# Patient Record
Sex: MALE | Race: WHITE | NOT HISPANIC OR LATINO | Employment: FULL TIME | URBAN - METROPOLITAN AREA
[De-identification: names, ages, dates, MRNs, and addresses within clinical notes are randomized per-mention and may not be internally consistent; named-entity substitution may affect disease eponyms.]

---

## 2017-02-23 ENCOUNTER — LAB VISIT (OUTPATIENT)
Dept: LAB | Facility: HOSPITAL | Age: 27
End: 2017-02-23
Attending: FAMILY MEDICINE
Payer: COMMERCIAL

## 2017-02-23 ENCOUNTER — OFFICE VISIT (OUTPATIENT)
Dept: FAMILY MEDICINE | Facility: CLINIC | Age: 27
End: 2017-02-23
Payer: COMMERCIAL

## 2017-02-23 VITALS
HEART RATE: 91 BPM | WEIGHT: 225.31 LBS | HEIGHT: 72 IN | SYSTOLIC BLOOD PRESSURE: 120 MMHG | TEMPERATURE: 99 F | RESPIRATION RATE: 16 BRPM | BODY MASS INDEX: 30.52 KG/M2 | OXYGEN SATURATION: 99 % | DIASTOLIC BLOOD PRESSURE: 80 MMHG

## 2017-02-23 DIAGNOSIS — Z00.00 ENCOUNTER FOR WELLNESS EXAMINATION: ICD-10-CM

## 2017-02-23 DIAGNOSIS — Z00.00 ENCOUNTER FOR ANNUAL PHYSICAL EXAM: ICD-10-CM

## 2017-02-23 DIAGNOSIS — Z00.00 ENCOUNTER FOR ANNUAL PHYSICAL EXAM: Primary | ICD-10-CM

## 2017-02-23 LAB
ALBUMIN SERPL BCP-MCNC: 4.3 G/DL
ALP SERPL-CCNC: 67 U/L
ALT SERPL W/O P-5'-P-CCNC: 41 U/L
ANION GAP SERPL CALC-SCNC: 6 MMOL/L
AST SERPL-CCNC: 34 U/L
BILIRUB SERPL-MCNC: 2.2 MG/DL
BUN SERPL-MCNC: 13 MG/DL
CALCIUM SERPL-MCNC: 9.5 MG/DL
CHLORIDE SERPL-SCNC: 104 MMOL/L
CHOLEST/HDLC SERPL: 4.2 {RATIO}
CO2 SERPL-SCNC: 28 MMOL/L
CREAT SERPL-MCNC: 1 MG/DL
EST. GFR  (AFRICAN AMERICAN): >60 ML/MIN/1.73 M^2
EST. GFR  (NON AFRICAN AMERICAN): >60 ML/MIN/1.73 M^2
GLUCOSE SERPL-MCNC: 99 MG/DL
HAV IGM SERPL QL IA: NEGATIVE
HBV CORE IGM SERPL QL IA: NEGATIVE
HBV SURFACE AG SERPL QL IA: NEGATIVE
HCV AB SERPL QL IA: NEGATIVE
HDL/CHOLESTEROL RATIO: 24 %
HDLC SERPL-MCNC: 217 MG/DL
HDLC SERPL-MCNC: 52 MG/DL
HIV 1+2 AB+HIV1 P24 AG SERPL QL IA: NEGATIVE
LDLC SERPL CALC-MCNC: 147.2 MG/DL
NONHDLC SERPL-MCNC: 165 MG/DL
POTASSIUM SERPL-SCNC: 4 MMOL/L
PROT SERPL-MCNC: 7.7 G/DL
SODIUM SERPL-SCNC: 138 MMOL/L
TRIGL SERPL-MCNC: 89 MG/DL

## 2017-02-23 PROCEDURE — 99213 OFFICE O/P EST LOW 20 MIN: CPT | Mod: S$GLB,,, | Performed by: NURSE PRACTITIONER

## 2017-02-23 PROCEDURE — 80074 ACUTE HEPATITIS PANEL: CPT

## 2017-02-23 PROCEDURE — 99999 PR PBB SHADOW E&M-EST. PATIENT-LVL III: CPT | Mod: PBBFAC,,, | Performed by: NURSE PRACTITIONER

## 2017-02-23 PROCEDURE — 1160F RVW MEDS BY RX/DR IN RCRD: CPT | Mod: S$GLB,,, | Performed by: NURSE PRACTITIONER

## 2017-02-23 PROCEDURE — 80053 COMPREHEN METABOLIC PANEL: CPT

## 2017-02-23 PROCEDURE — 86703 HIV-1/HIV-2 1 RESULT ANTBDY: CPT

## 2017-02-23 PROCEDURE — 87591 N.GONORRHOEAE DNA AMP PROB: CPT

## 2017-02-23 PROCEDURE — 86592 SYPHILIS TEST NON-TREP QUAL: CPT

## 2017-02-23 PROCEDURE — 80061 LIPID PANEL: CPT

## 2017-02-23 PROCEDURE — 36415 COLL VENOUS BLD VENIPUNCTURE: CPT | Mod: PO

## 2017-02-23 RX ORDER — DEXTROAMPHETAMINE SACCHARATE, AMPHETAMINE ASPARTATE, DEXTROAMPHETAMINE SULFATE AND AMPHETAMINE SULFATE 2.5; 2.5; 2.5; 2.5 MG/1; MG/1; MG/1; MG/1
TABLET ORAL
COMMUNITY
Start: 2017-02-22 | End: 2017-05-24 | Stop reason: DRUGHIGH

## 2017-02-23 RX ORDER — KETOCONAZOLE 20 MG/G
CREAM TOPICAL
Refills: 3 | COMMUNITY
Start: 2017-01-21 | End: 2019-07-22

## 2017-02-23 NOTE — MR AVS SNAPSHOT
[facility] [place] [facility]  [address]  [place] [address]  Phone: [phone]  Fax: [phone]                  Francisco Maxwell   2017 9:00 AM   Office Visit    Description:  Male : 1990   Provider:  Nidia Belle NP   Department:  [facility] [place] [facility]           Reason for Visit     stomach virus           Diagnoses this Visit        Comments    Encounter for annual physical exam    -  Primary     Encounter for wellness examination                To Do List           Future Appointments        Provider Department Dept Phone    3/29/2017 8:00 AM JEOVANNY Coles MD [facility] [phone]      Goals (5 Years of Data)     None      [facility] On Call     [facility] On Call Nurse Care Line -  Assistance  Registered nurses in the [facility] On Call Center provide clinical advisement, health education, appointment booking, and other advisory services.  Call for this free service at [phone].             Medications           Message regarding Medications     Verify the changes and/or additions to your medication regime listed below are the same as discussed with your clinician today.  If any of these changes or additions are incorrect, please notify your healthcare provider.             Verify that the below list of medications is an accurate representation of the medications you are currently taking.  If none reported, the list may be blank. If incorrect, please contact your healthcare provider. Carry this list with you in case of emergency.           Current Medications     alprazolam (XANAX) 0.5 MG tablet TK 1 T PO TID PRF ANXIETY OR STRESS    dextroamphetamine-amphetamine 10 mg Tab     escitalopram oxalate (LEXAPRO) 10 MG tablet Take 1 tablet (10 mg total) by mouth once daily.    fish oil-omega-3 fatty acids 300-1,000 mg capsule Take 2 g by mouth once daily.    ketoconazole (NIZORAL) 2 % cream YOLIS AA BID           Clinical Reference Information            Your Vitals Were     BP Pulse Temp Resp Height Weight    120/80 91 98.6 °F (37 °C) (Oral) 16 6' (1.829 m) 102.2 kg (225 lb 5 oz)    SpO2 BMI             99% 30.56 kg/m2         Blood Pressure          Most Recent Value    BP  120/80      Allergies as of 2/23/2017     No Known Allergies      Immunizations Administered on Date of Encounter - 2/23/2017     None      Orders Placed During Today's Visit     Future Labs/Procedures Expected by Expires    C. trachomatis/N. gonorrhoeae by AMP DNA Urine  2/23/2017 4/24/2018    Comprehensive metabolic panel  2/23/2017 5/24/2017    Hepatitis panel, acute  2/23/2017 4/24/2018    HIV-1 and HIV-2 antibodies  2/23/2017 4/24/2018    Lipid panel  2/23/2017 4/24/2018    RPR  2/23/2017 4/24/2018      Smoking Cessation     If you would like to quit smoking:   You may be eligible for free services if you are a [place] resident and started smoking cigarettes before September 1, 1988.  Call the [employer]oking [employer]ess[employer] ([employer]) toll free at (665) 343-7590 or (285) 693-3572.   Call 1-463-QUIT-NOW if you do not meet the above criteria.            Language Assistance Services     ATTENTION: Language assistance services are available, free of charge. Please call [phone].      ATENCIÓN: Si habla español, tiene a savage disposición servicios gratuitos de asistencia lingüística. Llame al [phone].     CHÚ Ý: N?u b?n nói Ti?ng Vi?t, có các d?ch v? h? tr? ngôn ng? mi?n phí dành cho b?n. G?i s? [phone].         [facility] complies with applicable Federal civil rights laws and does not discriminate on the basis of race, color, national origin, age, disability, or sex.

## 2017-02-23 NOTE — PROGRESS NOTES
Subjective:       Patient ID: Francisco Maxwell is a 26 y.o. male.    Chief Complaint: stomach virus  She was last seen in primary care by Dr. Coles on 05/19/2016. This is his first time seeing me in the clinic.  HPI   He would like health screening for employment and other wellness screenings today.  Vitals:    02/23/17 0857   BP: 120/80   Pulse: 91   Resp: 16   Temp: 98.6 °F (37 °C)     Review of Systems    States would like STD screening with starting new relationship  He does take numerous herbs and naturopathic remedies   Objective:      Physical Exam   Constitutional: He is oriented to person, place, and time. Vital signs are normal. He appears well-developed and well-nourished.   HENT:   Head: Normocephalic and atraumatic.   Right Ear: Hearing, tympanic membrane, external ear and ear canal normal.   Left Ear: Hearing, tympanic membrane, external ear and ear canal normal.   Nose: Nose normal.   Mouth/Throat: Uvula is midline, oropharynx is clear and moist and mucous membranes are normal.   Eyes: Lids are normal.   Neck: Normal range of motion. Neck supple.   Cardiovascular: Normal rate, regular rhythm and normal heart sounds.    Pulmonary/Chest: Effort normal and breath sounds normal.   Abdominal: Soft. Bowel sounds are normal.   Lymphadenopathy:        Head (right side): No submental, no submandibular, no tonsillar, no preauricular, no posterior auricular and no occipital adenopathy present.        Head (left side): No submental, no submandibular, no tonsillar, no preauricular, no posterior auricular and no occipital adenopathy present.     He has no cervical adenopathy.   Neurological: He is alert and oriented to person, place, and time.   Skin: Skin is warm, dry and intact.   Psychiatric: He has a normal mood and affect. His speech is normal and behavior is normal. Judgment and thought content normal. Cognition and memory are normal.   Nursing note and vitals reviewed.      Assessment & Plan:        Encounter for annual physical exam  -     Lipid panel; Future; Expected date: 2/23/17  -     Comprehensive metabolic panel; Future; Expected date: 2/23/17  -     HIV-1 and HIV-2 antibodies; Future; Expected date: 2/23/17  -     RPR; Future; Expected date: 2/23/17  -     Hepatitis panel, acute; Future; Expected date: 2/23/17  -     C. trachomatis/N. gonorrhoeae by AMP DNA Urine; Future; Expected date: 2/23/17    Encounter for wellness examination  -     Lipid panel; Future; Expected date: 2/23/17  -     Comprehensive metabolic panel; Future; Expected date: 2/23/17  -     HIV-1 and HIV-2 antibodies; Future; Expected date: 2/23/17  -     RPR; Future; Expected date: 2/23/17  -     Hepatitis panel, acute; Future; Expected date: 2/23/17  -     C. trachomatis/N. gonorrhoeae by AMP DNA Urine; Future; Expected date: 2/23/17        Health screening from started and will complete when labs are available.  No Follow-up on file.

## 2017-02-24 LAB — RPR SER QL: NORMAL

## 2017-02-27 LAB
C TRACH DNA SPEC QL NAA+PROBE: NEGATIVE
N GONORRHOEA DNA SPEC QL NAA+PROBE: NEGATIVE

## 2017-03-29 ENCOUNTER — OFFICE VISIT (OUTPATIENT)
Dept: FAMILY MEDICINE | Facility: CLINIC | Age: 27
End: 2017-03-29
Payer: COMMERCIAL

## 2017-03-29 VITALS
HEART RATE: 82 BPM | RESPIRATION RATE: 16 BRPM | DIASTOLIC BLOOD PRESSURE: 80 MMHG | OXYGEN SATURATION: 97 % | HEIGHT: 72 IN | BODY MASS INDEX: 29.89 KG/M2 | WEIGHT: 220.69 LBS | TEMPERATURE: 99 F | SYSTOLIC BLOOD PRESSURE: 136 MMHG

## 2017-03-29 DIAGNOSIS — F41.9 ANXIETY: Primary | ICD-10-CM

## 2017-03-29 PROCEDURE — 99213 OFFICE O/P EST LOW 20 MIN: CPT | Mod: S$GLB,,, | Performed by: NURSE PRACTITIONER

## 2017-03-29 PROCEDURE — 1160F RVW MEDS BY RX/DR IN RCRD: CPT | Mod: S$GLB,,, | Performed by: NURSE PRACTITIONER

## 2017-03-29 PROCEDURE — 99999 PR PBB SHADOW E&M-EST. PATIENT-LVL III: CPT | Mod: PBBFAC,,, | Performed by: NURSE PRACTITIONER
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
## 2017-05-24 ENCOUNTER — OFFICE VISIT (OUTPATIENT)
Dept: FAMILY MEDICINE | Facility: CLINIC | Age: 27
End: 2017-05-24
Payer: COMMERCIAL

## 2017-05-24 VITALS
DIASTOLIC BLOOD PRESSURE: 70 MMHG | OXYGEN SATURATION: 98 % | WEIGHT: 213.88 LBS | HEART RATE: 70 BPM | HEIGHT: 72 IN | TEMPERATURE: 99 F | BODY MASS INDEX: 28.97 KG/M2 | SYSTOLIC BLOOD PRESSURE: 130 MMHG

## 2017-05-24 DIAGNOSIS — S99.921A FOOT INJURY, RIGHT, INITIAL ENCOUNTER: Primary | ICD-10-CM

## 2017-05-24 PROCEDURE — 1160F RVW MEDS BY RX/DR IN RCRD: CPT | Mod: S$GLB,,, | Performed by: NURSE PRACTITIONER

## 2017-05-24 PROCEDURE — 99213 OFFICE O/P EST LOW 20 MIN: CPT | Mod: S$GLB,,, | Performed by: NURSE PRACTITIONER

## 2017-05-24 RX ORDER — BENZONATATE 200 MG/1
1 CAPSULE ORAL
COMMUNITY
Start: 2017-03-18 | End: 2019-07-22

## 2017-05-24 RX ORDER — CALCIUM CARBONATE/VITAMIN D3 500-10/5ML
1 LIQUID (ML) ORAL DAILY
COMMUNITY

## 2017-05-24 RX ORDER — DEXTROAMPHETAMINE SACCHARATE, AMPHETAMINE ASPARTATE, DEXTROAMPHETAMINE SULFATE AND AMPHETAMINE SULFATE 3.75; 3.75; 3.75; 3.75 MG/1; MG/1; MG/1; MG/1
1 TABLET ORAL DAILY
Refills: 0 | COMMUNITY
Start: 2017-04-25 | End: 2019-04-18

## 2017-05-24 RX ORDER — MULTIVITAMIN
1 TABLET ORAL DAILY
COMMUNITY

## 2017-05-24 RX ORDER — MULTIVIT WITH MINERALS/HERBS
1 TABLET ORAL DAILY
COMMUNITY

## 2017-05-24 NOTE — PROGRESS NOTES
Subjective:       Patient ID: Francisco Maxwell is a 26 y.o. male.    Chief Complaint: Foot Problem (left)    HPI onset Sunday. Running on the beach and hyper  Extended his right foot. Having some discomfort to the top of his foot. States it is much improved from onset Sunday. He did not have any bruising. He has been elevating his foot and wearing good support shoes. No other concerns. See ROS    The following portion of the patients history was reviewed and updated as appropriate: allergies, current medications, past medical and surgical history. Past social history and problem list reviewed. Family PMH and Past social history reviewed. Tobacco, Illicit drug use reviewed.     Review of Systems  Constitutional: No fatigue or fever    Skin: no rashes or lesions  Respiratory:   No SOB, Wheezing, cough  Cardiovascular:   No CP, Palpitations  Musculoskeletal:   right  Foot pain, see HPI  No change in gait or coordination. .    Objective:     /70 (BP Location: Left arm, Patient Position: Sitting, BP Method: Manual)   Pulse 70   Temp 98.5 °F (36.9 °C) (Oral)   Ht 6' (1.829 m)   Wt 97 kg (213 lb 13.5 oz)   SpO2 98%   BMI 29.00 kg/m²      Physical Exam     Constitutional: oriented to person, place, and time. well-developed and well-nourished.   Cardiovascular: Normal rate, regular rhythm and normal heart sounds.    Pulmonary/Chest: Effort normal and breath sounds normal. No respiratory distress. No wheezes.   Musculoskeletal: Normal range of motion. some tenderness with flexion of the foot and extension. Pain to top of 2nd tendon.  Skin: Skin is warm and dry. No rashes or lesions. No bruising noted.     Assessment:       1. Foot injury, right, initial encounter        Plan:         Francisco was seen today for foot problem.    Diagnoses and all orders for this visit:    Foot injury, right, initial encounter: will get xray of the foot. No exercise until xray complete. If activity hurts, stop doing it. Wear  good support shoes.  -     X-Ray Foot Complete Left; Future      Healthy diet, exercise  Adequate rest  Adequate hydration  Avoid allergens  Avoid excessive caffeine

## 2018-08-10 DIAGNOSIS — B35.1 TOENAIL FUNGUS: ICD-10-CM

## 2018-08-10 DIAGNOSIS — F51.01 PRIMARY INSOMNIA: Primary | ICD-10-CM

## 2018-08-10 RX ORDER — TERBINAFINE HYDROCHLORIDE 250 MG/1
250 TABLET ORAL DAILY
Qty: 30 TABLET | Refills: 2 | Status: SHIPPED | OUTPATIENT
Start: 2018-08-10 | End: 2018-09-09

## 2019-02-26 DIAGNOSIS — L02.91 ABSCESS: Primary | ICD-10-CM

## 2019-02-26 RX ORDER — SULFAMETHOXAZOLE AND TRIMETHOPRIM 800; 160 MG/1; MG/1
1 TABLET ORAL 2 TIMES DAILY
Qty: 20 TABLET | Refills: 0 | Status: SHIPPED | OUTPATIENT
Start: 2019-02-26 | End: 2019-03-08

## 2019-04-18 ENCOUNTER — OFFICE VISIT (OUTPATIENT)
Dept: OPHTHALMOLOGY | Facility: CLINIC | Age: 29
End: 2019-04-18
Payer: COMMERCIAL

## 2019-04-18 DIAGNOSIS — S05.8X1A BLUNT TRAUMA OF RIGHT EYE, INITIAL ENCOUNTER: Primary | ICD-10-CM

## 2019-04-18 DIAGNOSIS — H52.4 MYOPIA OF BOTH EYES WITH ASTIGMATISM AND PRESBYOPIA: ICD-10-CM

## 2019-04-18 DIAGNOSIS — H52.13 MYOPIA OF BOTH EYES WITH ASTIGMATISM AND PRESBYOPIA: ICD-10-CM

## 2019-04-18 DIAGNOSIS — H52.203 MYOPIA OF BOTH EYES WITH ASTIGMATISM AND PRESBYOPIA: ICD-10-CM

## 2019-04-18 PROCEDURE — 92020 GONIOSCOPY: CPT | Mod: S$GLB,,, | Performed by: OPHTHALMOLOGY

## 2019-04-18 PROCEDURE — 92020 PR SPECIAL EYE EVAL,GONIOSCOPY: ICD-10-PCS | Mod: S$GLB,,, | Performed by: OPHTHALMOLOGY

## 2019-04-18 PROCEDURE — 92004 PR EYE EXAM, NEW PATIENT,COMPREHESV: ICD-10-PCS | Mod: S$GLB,,, | Performed by: OPHTHALMOLOGY

## 2019-04-18 PROCEDURE — 99999 PR PBB SHADOW E&M-EST. PATIENT-LVL III: ICD-10-PCS | Mod: PBBFAC,,, | Performed by: OPHTHALMOLOGY

## 2019-04-18 PROCEDURE — 99999 PR PBB SHADOW E&M-EST. PATIENT-LVL III: CPT | Mod: PBBFAC,,, | Performed by: OPHTHALMOLOGY

## 2019-04-18 PROCEDURE — 92004 COMPRE OPH EXAM NEW PT 1/>: CPT | Mod: S$GLB,,, | Performed by: OPHTHALMOLOGY

## 2019-04-18 RX ORDER — DEXTROAMPHETAMINE SACCHARATE, AMPHETAMINE ASPARTATE, DEXTROAMPHETAMINE SULFATE AND AMPHETAMINE SULFATE 7.5; 7.5; 7.5; 7.5 MG/1; MG/1; MG/1; MG/1
30 TABLET ORAL
COMMUNITY
Start: 2019-03-19 | End: 2022-06-13
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
## 2019-07-22 ENCOUNTER — OFFICE VISIT (OUTPATIENT)
Dept: FAMILY MEDICINE | Facility: CLINIC | Age: 29
End: 2019-07-22
Payer: COMMERCIAL

## 2019-07-22 VITALS
OXYGEN SATURATION: 99 % | SYSTOLIC BLOOD PRESSURE: 138 MMHG | HEIGHT: 76 IN | TEMPERATURE: 99 F | HEART RATE: 73 BPM | BODY MASS INDEX: 25.72 KG/M2 | DIASTOLIC BLOOD PRESSURE: 80 MMHG | WEIGHT: 211.19 LBS

## 2019-07-22 DIAGNOSIS — M79.10 MUSCLE PAIN: Primary | ICD-10-CM

## 2019-07-22 PROCEDURE — 99213 OFFICE O/P EST LOW 20 MIN: CPT | Mod: S$GLB,,, | Performed by: FAMILY MEDICINE

## 2019-07-22 PROCEDURE — 3008F PR BODY MASS INDEX (BMI) DOCUMENTED: ICD-10-PCS | Mod: CPTII,S$GLB,, | Performed by: FAMILY MEDICINE

## 2019-07-22 PROCEDURE — 99213 PR OFFICE/OUTPT VISIT, EST, LEVL III, 20-29 MIN: ICD-10-PCS | Mod: S$GLB,,, | Performed by: FAMILY MEDICINE

## 2019-07-22 PROCEDURE — 99999 PR PBB SHADOW E&M-EST. PATIENT-LVL III: ICD-10-PCS | Mod: PBBFAC,,, | Performed by: FAMILY MEDICINE

## 2019-07-22 PROCEDURE — 3008F BODY MASS INDEX DOCD: CPT | Mod: CPTII,S$GLB,, | Performed by: FAMILY MEDICINE

## 2019-07-22 PROCEDURE — 99999 PR PBB SHADOW E&M-EST. PATIENT-LVL III: CPT | Mod: PBBFAC,,, | Performed by: FAMILY MEDICINE

## 2019-07-22 RX ORDER — FINASTERIDE 1 MG/1
1 TABLET, FILM COATED ORAL DAILY
Refills: 3 | COMMUNITY
Start: 2019-05-12 | End: 2022-06-13

## 2019-07-22 NOTE — PROGRESS NOTES
"Subjective:       Patient ID: Francisco Maxwell is a 28 y.o. male.    Chief Complaint: Cough (coughing producing sputum, wheezing, and occasional heartburn)    Pt is known to me.    Review of Systems   Constitutional: Negative for activity change, appetite change, fatigue and unexpected weight change.   Eyes: Negative for visual disturbance.   Respiratory: Positive for cough and wheezing (with forceful exhalation). Negative for chest tightness and shortness of breath.    Cardiovascular: Negative for chest pain, palpitations and leg swelling.   Gastrointestinal: Negative for abdominal pain, constipation, diarrhea, nausea and vomiting.        Heartburn--started OTC Prilosec yesterday   Endocrine: Negative for cold intolerance, heat intolerance and polyuria.   Genitourinary: Negative for decreased urine volume and dysuria.   Musculoskeletal: Positive for myalgias (right neck in to chest). Negative for arthralgias and back pain.   Skin: Negative for rash.   Neurological: Negative for numbness and headaches.       Objective:       Vitals:    07/22/19 1612 07/22/19 1627   BP: 138/80    BP Location: Left arm    Patient Position: Sitting    Pulse: 73    Temp: 98.5 °F (36.9 °C)    TempSrc: Oral    SpO2: 99%    Weight: 95.8 kg (211 lb 3.2 oz)    Height: 6' (1.829 m) 6' 4" (1.93 m)     Physical Exam   Constitutional: He is oriented to person, place, and time. He appears well-developed and well-nourished.   HENT:   Head: Normocephalic.   Eyes: Pupils are equal, round, and reactive to light. Conjunctivae and EOM are normal.   Neck: Normal range of motion. Neck supple. No thyromegaly present.   Cardiovascular: Normal rate, regular rhythm and normal heart sounds.   Pulmonary/Chest: Effort normal and breath sounds normal.   Abdominal: Soft. Bowel sounds are normal. There is no tenderness.   Musculoskeletal: Normal range of motion. He exhibits tenderness (right SCM and right rhomboid). He exhibits no deformity. "   Lymphadenopathy:     He has no cervical adenopathy.   Neurological: He is alert and oriented to person, place, and time. He displays normal reflexes. No cranial nerve deficit. He exhibits normal muscle tone. Coordination normal.   Skin: Skin is warm and dry.   Psychiatric: He has a normal mood and affect. His behavior is normal.       Assessment:       1. Muscle pain        Plan:       Francisco was seen today for cough.    Diagnoses and all orders for this visit:    Muscle pain      During this visit, I reviewed the pt's history, medications, allergies, and problem list.    Stretches demonstrated

## 2019-07-23 ENCOUNTER — HOSPITAL ENCOUNTER (OUTPATIENT)
Dept: RADIOLOGY | Facility: HOSPITAL | Age: 29
Discharge: HOME | End: 2019-07-23
Attending: PHYSICIAN ASSISTANT
Payer: COMMERCIAL

## 2019-07-23 ENCOUNTER — OFFICE VISIT (OUTPATIENT)
Dept: FAMILY MEDICINE | Facility: CLINIC | Age: 29
End: 2019-07-23
Payer: COMMERCIAL

## 2019-07-23 VITALS
BODY MASS INDEX: 25.08 KG/M2 | WEIGHT: 205.94 LBS | HEART RATE: 75 BPM | DIASTOLIC BLOOD PRESSURE: 80 MMHG | TEMPERATURE: 98 F | SYSTOLIC BLOOD PRESSURE: 120 MMHG | HEIGHT: 76 IN | OXYGEN SATURATION: 98 %

## 2019-07-23 DIAGNOSIS — S90.851A FOREIGN BODY IN RIGHT FOOT, INITIAL ENCOUNTER: ICD-10-CM

## 2019-07-23 DIAGNOSIS — S91.332A PUNCTURE WOUND OF LEFT FOOT, INITIAL ENCOUNTER: Primary | ICD-10-CM

## 2019-07-23 PROCEDURE — 90471 TDAP VACCINE GREATER THAN OR EQUAL TO 7YO IM: ICD-10-PCS | Mod: S$GLB,,, | Performed by: PHYSICIAN ASSISTANT

## 2019-07-23 PROCEDURE — 73630 X-RAY EXAM OF FOOT: CPT | Mod: 26,RT,, | Performed by: RADIOLOGY

## 2019-07-23 PROCEDURE — 73630 X-RAY EXAM OF FOOT: CPT | Mod: TC,FY,PO,RT

## 2019-07-23 PROCEDURE — 3008F PR BODY MASS INDEX (BMI) DOCUMENTED: ICD-10-PCS | Mod: CPTII,S$GLB,, | Performed by: PHYSICIAN ASSISTANT

## 2019-07-23 PROCEDURE — 90715 TDAP VACCINE GREATER THAN OR EQUAL TO 7YO IM: ICD-10-PCS | Mod: S$GLB,,, | Performed by: PHYSICIAN ASSISTANT

## 2019-07-23 PROCEDURE — 99999 PR PBB SHADOW E&M-EST. PATIENT-LVL III: ICD-10-PCS | Mod: PBBFAC,,, | Performed by: PHYSICIAN ASSISTANT

## 2019-07-23 PROCEDURE — 99214 OFFICE O/P EST MOD 30 MIN: CPT | Mod: 25,S$GLB,, | Performed by: PHYSICIAN ASSISTANT

## 2019-07-23 PROCEDURE — 90471 IMMUNIZATION ADMIN: CPT | Mod: S$GLB,,, | Performed by: PHYSICIAN ASSISTANT

## 2019-07-23 PROCEDURE — 90715 TDAP VACCINE 7 YRS/> IM: CPT | Mod: S$GLB,,, | Performed by: PHYSICIAN ASSISTANT

## 2019-07-23 PROCEDURE — 99214 PR OFFICE/OUTPT VISIT, EST, LEVL IV, 30-39 MIN: ICD-10-PCS | Mod: 25,S$GLB,, | Performed by: PHYSICIAN ASSISTANT

## 2019-07-23 PROCEDURE — 3008F BODY MASS INDEX DOCD: CPT | Mod: CPTII,S$GLB,, | Performed by: PHYSICIAN ASSISTANT

## 2019-07-23 PROCEDURE — 99999 PR PBB SHADOW E&M-EST. PATIENT-LVL III: CPT | Mod: PBBFAC,,, | Performed by: PHYSICIAN ASSISTANT

## 2019-07-23 PROCEDURE — 73630 XR FOOT COMPLETE 3 VIEW RIGHT: ICD-10-PCS | Mod: 26,RT,, | Performed by: RADIOLOGY

## 2019-07-23 RX ORDER — SULFAMETHOXAZOLE AND TRIMETHOPRIM 800; 160 MG/1; MG/1
1 TABLET ORAL 2 TIMES DAILY
Qty: 14 TABLET | Refills: 0 | Status: SHIPPED | OUTPATIENT
Start: 2019-07-23 | End: 2019-07-23

## 2019-07-23 RX ORDER — SULFAMETHOXAZOLE AND TRIMETHOPRIM 800; 160 MG/1; MG/1
1 TABLET ORAL 2 TIMES DAILY
Qty: 14 TABLET | Refills: 0 | Status: SHIPPED | OUTPATIENT
Start: 2019-07-23 | End: 2019-07-30

## 2019-07-23 NOTE — PROGRESS NOTES
"actrimSubjective:      Patient ID: Francisco Maxwell is a 28 y.o. male.    Chief Complaint: Foot Injury (patient stepped on a toothpick lastnight and says the other half of the toothpick is still in his foot)    Patient is new to me, here today for foreign body to foot  Patient states he stepped on a toothpick and feels the other half of the toothpick is still in his foot   Patient has tried warm compresses and elevation    Foreign Body   The incident occurred 12 to 24 hours ago. Suspected object: a toothpick. Intake: foot. The incident was reported. Pertinent negatives include no abdominal pain, congestion, cough, fever, sore throat, vomiting or wheezing.     Review of Systems   Constitutional: Negative for chills, diaphoresis and fever.   HENT: Negative for congestion, rhinorrhea and sore throat.    Respiratory: Negative for cough, shortness of breath and wheezing.    Gastrointestinal: Negative for abdominal pain, constipation, diarrhea, nausea and vomiting.   Skin: Positive for wound (R foot). Negative for rash.   Neurological: Negative for dizziness, light-headedness and headaches.       Objective:   /80 (BP Location: Left arm, Patient Position: Sitting)   Pulse 75   Temp 98.4 °F (36.9 °C) (Oral)   Ht 6' 4" (1.93 m)   Wt 93.4 kg (205 lb 14.6 oz)   SpO2 98%   BMI 25.06 kg/m²   Physical Exam   Constitutional: He appears well-developed and well-nourished. He does not appear ill. No distress.   HENT:   Head: Normocephalic and atraumatic.   Cardiovascular: Normal rate, regular rhythm and normal heart sounds.   No murmur heard.  Pulmonary/Chest: Effort normal and breath sounds normal. No respiratory distress. He has no decreased breath sounds.   Musculoskeletal:        Right foot: There is tenderness. There is normal range of motion, no swelling, normal capillary refill and no deformity.        Feet:    Skin: Skin is warm, dry and intact. No rash noted.   Psychiatric: He has a normal mood and " affect. His speech is normal and behavior is normal. Thought content normal.     Assessment:      1. Puncture wound of left foot, initial encounter    2. Foreign body in right foot, initial encounter       Plan:   Puncture wound of left foot, initial encounter  -     (In Office Administered) Tdap Vaccine    Foreign body in right foot, initial encounter  -     X-Ray Foot Complete Right; Future; Expected date: 07/23/2019    Other orders  -     sulfamethoxazole-trimethoprim 800-160mg (BACTRIM DS) 800-160 mg Tab; Take 1 tablet by mouth 2 (two) times daily. for 7 days  Dispense: 14 tablet; Refill: 0    Discussed worsening signs/symptoms and when to return to clinic or go to ED.   Patient expresses understanding and agrees with treatment plan.     The pt was seen by Dr. Gruber on 7/25/2019--needs surgery under MAC to remove FB in foot.  Based upon my exam of the pt on 7/22/2019, the pt is cleared for surgery.//KIM Myers MD

## 2019-07-25 ENCOUNTER — HOSPITAL ENCOUNTER (OUTPATIENT)
Dept: RADIOLOGY | Facility: HOSPITAL | Age: 29
Discharge: HOME | End: 2019-07-25
Attending: PODIATRIST
Payer: COMMERCIAL

## 2019-07-25 ENCOUNTER — OFFICE VISIT (OUTPATIENT)
Dept: PODIATRY | Facility: CLINIC | Age: 29
End: 2019-07-25
Payer: COMMERCIAL

## 2019-07-25 VITALS
SYSTOLIC BLOOD PRESSURE: 122 MMHG | BODY MASS INDEX: 25.08 KG/M2 | HEIGHT: 76 IN | HEART RATE: 82 BPM | DIASTOLIC BLOOD PRESSURE: 75 MMHG | WEIGHT: 205.94 LBS

## 2019-07-25 DIAGNOSIS — L03.115 CELLULITIS OF FOOT, RIGHT: ICD-10-CM

## 2019-07-25 DIAGNOSIS — M79.671 PAIN IN RIGHT FOOT: ICD-10-CM

## 2019-07-25 DIAGNOSIS — L08.9 FOREIGN BODY IN FOOT, RIGHT, INFECTED, INITIAL ENCOUNTER: ICD-10-CM

## 2019-07-25 DIAGNOSIS — L08.9 FOREIGN BODY IN FOOT, RIGHT, INFECTED, INITIAL ENCOUNTER: Primary | ICD-10-CM

## 2019-07-25 DIAGNOSIS — S90.851A FOREIGN BODY IN FOOT, RIGHT, INFECTED, INITIAL ENCOUNTER: ICD-10-CM

## 2019-07-25 DIAGNOSIS — S90.851A FOREIGN BODY IN FOOT, RIGHT, INFECTED, INITIAL ENCOUNTER: Primary | ICD-10-CM

## 2019-07-25 PROCEDURE — 76882 US LMTD JT/FCL EVL NVASC XTR: CPT | Mod: TC,PO,RT

## 2019-07-25 PROCEDURE — 3008F PR BODY MASS INDEX (BMI) DOCUMENTED: ICD-10-PCS | Mod: CPTII,S$GLB,, | Performed by: PODIATRIST

## 2019-07-25 PROCEDURE — 99999 PR PBB SHADOW E&M-EST. PATIENT-LVL IV: CPT | Mod: PBBFAC,,, | Performed by: PODIATRIST

## 2019-07-25 PROCEDURE — 99204 PR OFFICE/OUTPT VISIT, NEW, LEVL IV, 45-59 MIN: ICD-10-PCS | Mod: 57,S$GLB,, | Performed by: PODIATRIST

## 2019-07-25 PROCEDURE — 76882 US EXTREMITY NON VASCULAR LIMITED RIGHT: ICD-10-PCS | Mod: 26,RT,, | Performed by: RADIOLOGY

## 2019-07-25 PROCEDURE — 99999 PR PBB SHADOW E&M-EST. PATIENT-LVL IV: ICD-10-PCS | Mod: PBBFAC,,, | Performed by: PODIATRIST

## 2019-07-25 PROCEDURE — 3008F BODY MASS INDEX DOCD: CPT | Mod: CPTII,S$GLB,, | Performed by: PODIATRIST

## 2019-07-25 PROCEDURE — 99204 OFFICE O/P NEW MOD 45 MIN: CPT | Mod: 57,S$GLB,, | Performed by: PODIATRIST

## 2019-07-25 PROCEDURE — 76882 US LMTD JT/FCL EVL NVASC XTR: CPT | Mod: 26,RT,, | Performed by: RADIOLOGY

## 2019-07-25 NOTE — PROGRESS NOTES
Subjective:      Patient ID: Francisco Maxwell is a 28 y.o. male.    Chief Complaint: Foreign Body (right foot )      HPI:  Francisco Maxwell is a 28 y.o. male who presents to clinic with a chief complaint of foreign body and infection right foot.  Patient reports dancing around house Monday night and a toothpick became lodged in his right foot.  He brought fragment with him today that he was able to pull out, which is nearly full length.  He states that fragment broke off as he was pulling the majority of it out.  He saw primary care the following day, was given tetanus shot, and prescribed Bactrim.  He informs of being instructed to soak his foot and did so by taking a bath.  He noticed worsening redness, swelling, and pain the next day but still tried to follow instructions and even tried lavender oil on his wound for the infection.  He relates having searched the internet for ways to remedy his infection and was considering cutting his foot open to get the other piece of the toothpick out.  He called primary care to get another appointment but was told to see Podiatry today.  He rates pain as 10/10 on the pain scale and describes it as intense burning and throbbing pain.  He insists that he wants the piece removed as soon as possible.  Patient denies any other pedal complaints at this time.      PCP:  KIM Myers MD  Date last seen:  7/23/19  ROQUE Barbour    Review of Systems   Constitutional: Negative for appetite change, fever, chills, fatigue and unexpected weight change.   Respiratory: Negative for cough, wheezing, and shortness of breath.   Cardiovascular: Negative for chest pain, claudication, cyanosis, and leg swelling.  Endocrine:  Negative for intolerance to cold, intolerance to heat, polydipsia, polyphagia, and polyuria.    Gastrointestinal: Negative for nausea, vomiting, diarrhea, and constipation.   Musculoskeletal: Negative for back pain, arthritis.  Positive for  joint pain, joint swelling, myalgias, stiffness.   Skin: Negative for nail bed changes, rash, itching, poor wound healing, unusual hair distribution.   Positive for discoloration, suspicious lesion.  Neurological: Negative for loss of balance, sensory change, paresthesias, and numbness.  Positive for pain.  Hematological: Negative for adenopathy, bleeding, and bruising easily.   Psychiatric/Behavioral: The patient is not nervous/anxious.  Negative for altered mental status.    No results found for: HGBA1C    History reviewed. No pertinent past medical history.  History reviewed. No pertinent surgical history.  Family History   Problem Relation Age of Onset    Amblyopia Neg Hx     Blindness Neg Hx     Cataracts Neg Hx     Glaucoma Neg Hx     Macular degeneration Neg Hx     Retinal detachment Neg Hx     Strabismus Neg Hx      Social History     Socioeconomic History    Marital status: Single     Spouse name: Not on file    Number of children: Not on file    Years of education: Not on file    Highest education level: Not on file   Occupational History    Not on file   Social Needs    Financial resource strain: Not on file    Food insecurity:     Worry: Not on file     Inability: Not on file    Transportation needs:     Medical: Not on file     Non-medical: Not on file   Tobacco Use    Smoking status: Current Every Day Smoker     Packs/day: 0.10     Types: Cigarettes   Substance and Sexual Activity    Alcohol use: Yes     Alcohol/week: 0.0 oz    Drug use: Not on file    Sexual activity: Not on file   Lifestyle    Physical activity:     Days per week: Not on file     Minutes per session: Not on file    Stress: Not on file   Relationships    Social connections:     Talks on phone: Not on file     Gets together: Not on file     Attends Rastafarian service: Not on file     Active member of club or organization: Not on file     Attends meetings of clubs or organizations: Not on file     Relationship  "status: Not on file   Other Topics Concern    Not on file   Social History Narrative    Not on file           Objective:        /75 (BP Location: Left arm, Patient Position: Sitting)   Pulse 82   Ht 6' 4" (1.93 m)   Wt 93.4 kg (205 lb 14.6 oz)   BMI 25.06 kg/m²     Physical Exam   Constitutional: Patient is oriented to person, place, and time. Patient appears well-developed and well-nourished. No acute distress.     Psychiatric: Patient has a normal mood and affect. Patient's speech is normal and behavior is normal. Judgment is normal. Cognition and memory are normal.     Right pedal exam was performed today.  Vascular: Pedal pulses palpable 2/4 DP & PT.  CFT is < 3 seconds to the hallux.  Skin temperature is warm to warm proximal tibia to distal toes with localized increase in calor noted.  +1/4 pitting edema with erythema, induration and proximal streaking to the ankle present.  No ecchymosis noted to the foot or ankle.  Hair growth present distally to the LE.     Musculoskeletal: Ankle joint ROM is decreased. Subtalar joint ROM is within normal limits.  Midtarsal joint ROM is within normal limits.  1st ray ROM is within normal limits.  1st  MTPJ ROM is decreased.  Ankle joint dorsiflexion is restricted with the knee extended and flexed per Silfverskiold exam.    Muscle strength is 5/5 for all LE muscle groups tested.    Neurological: Epicritic sensation is Intact to the foot.   Achilles DTR and Chaddock STR are Intact to right lower extremity.   Negative Babinski sign right lower extremity.  Negative clonus sign right lower extremity.  Pain upon palpation noted to right medial foot along region of erythema and induration proximal to the 1st MTPJ.  Palpable, hard, subcutaneous mass, likely foreign body, noted adjacent to wound.    Dermatological: Toenails 1-5 right are WNL in length and thickness.  Webspaces 1-4 right are clean, dry, and intact.  Skin turgor is increased to the medial aspect of the " right foot.  No dry, flaky skin noted to the LE.  Open wound noted to medial aspect of foot proximal to 1st MTPJ measuring 0.2 cm squared with minimal serous drainage noted.  No suspicious pigmented lesions appreciable to the foot or ankle.    Nursing note and vitals reviewed.        Assessment:       Encounter Diagnoses   Name Primary?    Foreign body in foot, right, infected, initial encounter Yes    Cellulitis of foot, right     Pain in right foot          Plan:       Francisco was seen today for foreign body.    Diagnoses and all orders for this visit:    Foreign body in foot, right, infected, initial encounter  -     US Extremity Non Vascular Limited Right; Future  -     Case Request Operating Room: REMOVAL, FOREIGN BODY, FOOT    Cellulitis of foot, right  -     US Extremity Non Vascular Limited Right; Future  -     Case Request Operating Room: REMOVAL, FOREIGN BODY, FOOT    Pain in right foot  -     US Extremity Non Vascular Limited Right; Future  -     Case Request Operating Room: REMOVAL, FOREIGN BODY, FOOT      I counseled the patient on his conditions, their implications and medical management.    - Discussed with patient that wound has become infected and that there is an area of induration likely corresponding to location of retained foreign body.  Due to severity of cellulitis, recommended incision and drainage with extraction of foreign body of the right foot, which patient opted for after all of his questions were answered and further discussion including alternative conservative and surgical interventions along with pertinent risks and benefits.  Case request was placed.    - Applied bandaid to wound.    Patient was given the following recommendations and instructions:  Patient Instructions   - Get history and physical from primary care physician for medical clearance for surgery.          Dolores Gruber DPM        Dictation was performed using M*Modal Fluency.  Transcription errors may be  present.

## 2019-07-26 ENCOUNTER — TELEPHONE (OUTPATIENT)
Dept: PODIATRY | Facility: CLINIC | Age: 29
End: 2019-07-26

## 2019-07-26 ENCOUNTER — TELEPHONE (OUTPATIENT)
Dept: ORTHOPEDICS | Facility: CLINIC | Age: 29
End: 2019-07-26

## 2019-07-26 ENCOUNTER — OFFICE VISIT (OUTPATIENT)
Dept: PODIATRY | Facility: CLINIC | Age: 29
End: 2019-07-26
Payer: COMMERCIAL

## 2019-07-26 VITALS
WEIGHT: 205 LBS | HEIGHT: 76 IN | DIASTOLIC BLOOD PRESSURE: 78 MMHG | SYSTOLIC BLOOD PRESSURE: 133 MMHG | HEART RATE: 72 BPM | BODY MASS INDEX: 24.96 KG/M2

## 2019-07-26 DIAGNOSIS — Z98.890 STATUS POST SURGERY: Primary | ICD-10-CM

## 2019-07-26 DIAGNOSIS — G89.18 ACUTE POST-OPERATIVE PAIN: ICD-10-CM

## 2019-07-26 DIAGNOSIS — L03.115 CELLULITIS OF FOOT, RIGHT: ICD-10-CM

## 2019-07-26 PROCEDURE — 99999 PR PBB SHADOW E&M-EST. PATIENT-LVL III: CPT | Mod: PBBFAC,,, | Performed by: PODIATRIST

## 2019-07-26 PROCEDURE — 99024 POSTOP FOLLOW-UP VISIT: CPT | Mod: S$GLB,,, | Performed by: PODIATRIST

## 2019-07-26 PROCEDURE — 99024 PR POST-OP FOLLOW-UP VISIT: ICD-10-PCS | Mod: S$GLB,,, | Performed by: PODIATRIST

## 2019-07-26 PROCEDURE — 99999 PR PBB SHADOW E&M-EST. PATIENT-LVL III: ICD-10-PCS | Mod: PBBFAC,,, | Performed by: PODIATRIST

## 2019-07-26 RX ORDER — HYDROCODONE BITARTRATE AND ACETAMINOPHEN 5; 325 MG/1; MG/1
1 TABLET ORAL EVERY 6 HOURS PRN
Qty: 20 TABLET | Refills: 0 | Status: SHIPPED | OUTPATIENT
Start: 2019-07-26 | End: 2019-07-31

## 2019-07-26 NOTE — PATIENT INSTRUCTIONS
- Use crutches for ambulatory assistance.    - Wear surgical shoe at all times when ambulating to prevent further trauma.    - Rest/reduce ambulation.    - Ice foot/ankle for no more than 20 minutes/hour to reduce pain and swelling.    - Keep dressing clean, dry, and intact.    - Elevate foot above the level of the knee as much as possible throughout the day to reduce pain and swelling.    - Take medications as prescribed.    - Take probiotics as prescribed.    - Notify clinic if redness, swelling, or pain worsens or fails to improve.

## 2019-07-26 NOTE — TELEPHONE ENCOUNTER
Spoke with patient to get wound care appointment scheduled and patient states he has not received a phone call for an appointment for wound care. Wound care number was given. Patient states will call and make an appointment due to transportation issues.     Dr Gruber called and states we will see pt in clinic today. Patient notified and states he is waiting for Dr Lemons office to call him back with an appointment.

## 2019-07-26 NOTE — TELEPHONE ENCOUNTER
----- Message from RT Trace sent at 7/26/2019 11:17 AM CDT -----  Contact: pt    pt , requesting an appt to be worked in today: Remove Packing, thanks.

## 2019-07-30 ENCOUNTER — OFFICE VISIT (OUTPATIENT)
Dept: PODIATRY | Facility: CLINIC | Age: 29
End: 2019-07-30
Payer: COMMERCIAL

## 2019-07-30 VITALS
WEIGHT: 205 LBS | BODY MASS INDEX: 27.77 KG/M2 | HEART RATE: 77 BPM | HEIGHT: 72 IN | DIASTOLIC BLOOD PRESSURE: 78 MMHG | SYSTOLIC BLOOD PRESSURE: 133 MMHG

## 2019-07-30 DIAGNOSIS — Z98.890 STATUS POST SURGERY: Primary | ICD-10-CM

## 2019-07-30 DIAGNOSIS — M79.671 PAIN IN RIGHT FOOT: ICD-10-CM

## 2019-07-30 PROCEDURE — 99999 PR PBB SHADOW E&M-EST. PATIENT-LVL III: CPT | Mod: PBBFAC,,, | Performed by: PODIATRIST

## 2019-07-30 PROCEDURE — 99024 PR POST-OP FOLLOW-UP VISIT: ICD-10-PCS | Mod: S$GLB,,, | Performed by: PODIATRIST

## 2019-07-30 PROCEDURE — 99024 POSTOP FOLLOW-UP VISIT: CPT | Mod: S$GLB,,, | Performed by: PODIATRIST

## 2019-07-30 PROCEDURE — 99999 PR PBB SHADOW E&M-EST. PATIENT-LVL III: ICD-10-PCS | Mod: PBBFAC,,, | Performed by: PODIATRIST

## 2019-08-01 ENCOUNTER — TELEPHONE (OUTPATIENT)
Dept: PODIATRY | Facility: CLINIC | Age: 29
End: 2019-08-01

## 2019-08-01 NOTE — TELEPHONE ENCOUNTER
Per verbal from Dr Gruber:  Patient informed that he is to finish both antibiotics.  He stated that he finished the Bactrim this morning and will continue the Cipro until done.

## 2019-08-01 NOTE — PROGRESS NOTES
Subjective:      Patient ID: Francisco Maxwell is a 28 y.o. male.    Chief Complaint: Wound Check (post op)      HPI:  Francisco Maxwell is a 28 y.o. male who presents to clinic with a chief complaint of wound right foot.  Patient is 1 day s/p I&D with removal of painful retained foreign body right foot and is here for dressing change with removal of packing gauze.  He reports pain began this morning and had difficulty going back to sleep once it began.  He describes pain as throbbing and rates it as 4/10 on the pain scale.  He informs of taking Cipro last night but forgot to take it this morning even though he had picked it up from the pharmacy.  He does inform of having taken Bactrim this morning.  He states that he had some upset stomach without nausea or vomiting this morning.  Patient denies any other pedal complaints at this time.      PCP:  KIM Myers MD  Date last seen:  7/23/19  ROQUE Barbour    Review of Systems   Constitutional: Negative for appetite change, fever, chills, fatigue and unexpected weight change.   Cardiovascular: Negative for chest pain, claudication, cyanosis, and leg swelling.  Gastrointestinal: Negative for nausea, vomiting, diarrhea, and constipation.   Musculoskeletal: Negative for back pain, arthritis, joint pain, joint swelling.  Positive for myalgias, stiffness.   Skin: Negative for nail bed changes, discoloration, suspicious lesion, rash, itching, poor wound healing, unusual hair distribution.     Neurological: Negative for loss of balance, sensory change, paresthesias, and numbness.  Positive for pain.  Hematological: Negative for adenopathy, bleeding, and bruising easily.   Psychiatric/Behavioral: The patient is not nervous/anxious.  Negative for altered mental status.    No results found for: HGBA1C    History reviewed. No pertinent past medical history.  Past Surgical History:   Procedure Laterality Date    INCISION AND DRAINAGE, FOOT Right  "7/25/2019    Performed by Dolores Gruber DPM at [facility]    REMOVAL, FOREIGN BODY, FOOT Right 7/25/2019    Performed by Dolores Gruber DPM at [facility] OR     Family History   Problem Relation Age of Onset    Amblyopia Neg Hx     Blindness Neg Hx     Cataracts Neg Hx     Glaucoma Neg Hx     Macular degeneration Neg Hx     Retinal detachment Neg Hx     Strabismus Neg Hx      Social History     Socioeconomic History    Marital status: Single     Spouse name: Not on file    Number of children: Not on file    Years of education: Not on file    Highest education level: Not on file   Occupational History    Not on file   Social Needs    Financial resource strain: Not on file    Food insecurity:     Worry: Not on file     Inability: Not on file    Transportation needs:     Medical: Not on file     Non-medical: Not on file   Tobacco Use    Smoking status: Current Every Day Smoker     Packs/day: 0.10     Types: Cigarettes   Substance and Sexual Activity    Alcohol use: Yes     Alcohol/week: 0.0 oz    Drug use: Not on file    Sexual activity: Not on file   Lifestyle    Physical activity:     Days per week: Not on file     Minutes per session: Not on file    Stress: Not on file   Relationships    Social connections:     Talks on phone: Not on file     Gets together: Not on file     Attends Hindu service: Not on file     Active member of club or organization: Not on file     Attends meetings of clubs or organizations: Not on file     Relationship status: Not on file   Other Topics Concern    Not on file   Social History Narrative    Not on file           Objective:        /78   Pulse 72   Ht 6' 4" (1.93 m)   Wt 93 kg (205 lb)   BMI 24.95 kg/m²     Physical Exam   Constitutional: Patient is oriented to person, place, and time. Patient appears well-developed and well-nourished. No acute distress.     Psychiatric: Patient has a normal mood and affect. Patient's speech is normal and behavior is " normal. Judgment is normal. Cognition and memory are normal.     Right pedal exam was performed today.  Vascular: Pedal pulses palpable 2/4 DP & PT.  CFT is < 3 seconds to the hallux.  Skin temperature is warm to warm proximal tibia to distal toes with mild localized increase in calor noted.  Nonpitting edema with elena-incisional erythema present.  No ecchymosis noted to the foot or ankle.  Hair growth present distally to the LE.     Musculoskeletal: Ankle joint ROM is decreased. Subtalar joint ROM is within normal limits.  Midtarsal joint ROM is within normal limits.  1st ray ROM is within normal limits.  1st  MTPJ ROM is decreased.  Ankle joint dorsiflexion is restricted with the knee extended and flexed per Silfverskiold exam.    Muscle strength is 5/5 for all LE muscle groups tested.    Neurological: Epicritic sensation is Intact to the foot.   Chaddock STR is Intact to right lower extremity.   Pain upon palpation noted to right medial foot elena-incisionally.    Dermatological: Toenails 1-5 right are WNL in length and thickness.  Webspaces 1-4 right are clean, dry, and intact.  Skin turgor is slightly increased to the medial column of the right foot.  No dry, flaky skin noted to the LE.  Partially closed incision with proximal open wound noted to medial aspect of foot with simple interrupted sutures intact and packing protruding from surgical wound noted soaked with sanguinous drainage without purulence appreciable.    Nursing note and vitals reviewed.        Assessment:       Encounter Diagnoses   Name Primary?    Status post surgery Yes    Acute post-operative pain     Cellulitis of foot, right          Plan:       Francisco was seen today for wound check.    Diagnoses and all orders for this visit:    Status post surgery  -     HYDROcodone-acetaminophen (NORCO) 5-325 mg per tablet; Take 1 tablet by mouth every 6 (six) hours as needed for Pain.    Acute post-operative pain  -     HYDROcodone-acetaminophen  (NORCO) 5-325 mg per tablet; Take 1 tablet by mouth every 6 (six) hours as needed for Pain.    Cellulitis of foot, right  -     HYDROcodone-acetaminophen (NORCO) 5-325 mg per tablet; Take 1 tablet by mouth every 6 (six) hours as needed for Pain.      I counseled the patient on his conditions, their implications and medical management.    - Reviewed with patient again surgical findings.    - Discussed with patient that wound packing needs to be removed and recommended local anesthetic for pain control during packing removal and dressing change to which patient was amenable.    - Removed dressing, cleansed right medial foot proximal to incision with betadine, administered 3 cc of 0.5% marcaine plain in a peripheral nerve block fashion, soaked packing with 3 cc of lidocaine with epinephrine, removed packing, flushed wound with normal saline, applied mastisol, steri-strips, betadine, 4 x 4 gauze, ABD pad, cast padding, and coban to foot.    - Advised patient to continue wearing surgical shoe at all times when ambulating, keep dressing CDI, follow PRICE therapy, and take medications as prescribed including antibiotics until he finishes their course.    - Patient able to begin weight-bearing to heel once nerve block completely wears off to pain tolerance.    Patient was given the following recommendations and instructions:  Patient Instructions   - Use crutches for ambulatory assistance.    - Wear surgical shoe at all times when ambulating to prevent further trauma.    - Rest/reduce ambulation.    - Ice foot/ankle for no more than 20 minutes/hour to reduce pain and swelling.    - Keep dressing clean, dry, and intact.    - Elevate foot above the level of the knee as much as possible throughout the day to reduce pain and swelling.    - Take medications as prescribed.    - Take probiotics as prescribed.    - Notify clinic if redness, swelling, or pain worsens or fails to improve.        Dolores Gruber, GUNNARM        Dictation  was performed using M*Modal Fluency.  Transcription errors may be present.

## 2019-08-01 NOTE — TELEPHONE ENCOUNTER
----- Message from Ping Gatica sent at 8/1/2019  8:28 AM CDT -----  Contact: self  Patient requesting to speak to nurse regarding antibiotic he is currently taking ,patient will like to know should he continue to take antibiotics      Patient contact is [phone] (home) \

## 2019-08-06 ENCOUNTER — OFFICE VISIT (OUTPATIENT)
Dept: PODIATRY | Facility: CLINIC | Age: 29
End: 2019-08-06
Payer: COMMERCIAL

## 2019-08-06 VITALS
SYSTOLIC BLOOD PRESSURE: 156 MMHG | BODY MASS INDEX: 27.17 KG/M2 | HEIGHT: 73 IN | WEIGHT: 205 LBS | DIASTOLIC BLOOD PRESSURE: 88 MMHG | RESPIRATION RATE: 13 BRPM | HEART RATE: 71 BPM

## 2019-08-06 DIAGNOSIS — Z98.890 STATUS POST SURGERY: Primary | ICD-10-CM

## 2019-08-06 PROCEDURE — 99999 PR PBB SHADOW E&M-EST. PATIENT-LVL III: CPT | Mod: PBBFAC,,, | Performed by: PODIATRIST

## 2019-08-06 PROCEDURE — 99999 PR PBB SHADOW E&M-EST. PATIENT-LVL III: ICD-10-PCS | Mod: PBBFAC,,, | Performed by: PODIATRIST

## 2019-08-06 PROCEDURE — 99024 POSTOP FOLLOW-UP VISIT: CPT | Mod: S$GLB,,, | Performed by: PODIATRIST

## 2019-08-06 PROCEDURE — 99024 PR POST-OP FOLLOW-UP VISIT: ICD-10-PCS | Mod: S$GLB,,, | Performed by: PODIATRIST

## 2019-08-11 NOTE — PROGRESS NOTES
Subjective:      Patient ID: Francisco Maxwell is a 28 y.o. male.    Chief Complaint: Post-op Evaluation      HPI:  Francisco Maxwell is a 28 y.o. male who presents to clinic with a chief complaint of wound right foot.  Patient is 1 week s/p I&D with removal of painful retained foreign body right foot and is here for dressing change with suture removal.  He reports pain only when foot hangs down for awhile but rates it as 0/10 on the pain scale currently.  Patient denies any other pedal complaints at this time.      PCP:  KIM Myers MD  Date last seen:  7/23/19  ROQUE Barbour    Review of Systems   Constitutional: Negative for appetite change, fever, chills, fatigue and unexpected weight change.   Cardiovascular: Negative for chest pain, claudication, cyanosis, and leg swelling.  Musculoskeletal: Negative for back pain, arthritis, joint pain, joint swelling.  Positive for myalgias, stiffness.   Skin: Negative for nail bed changes, discoloration, suspicious lesion, rash, itching, poor wound healing, unusual hair distribution.     Neurological: Negative for loss of balance, sensory change, paresthesias, and numbness.  Positive for pain.  Hematological: Negative for adenopathy, bleeding, and bruising easily.   Psychiatric/Behavioral: The patient is not nervous/anxious.  Negative for altered mental status.    No results found for: HGBA1C    History reviewed. No pertinent past medical history.  Past Surgical History:   Procedure Laterality Date    INCISION AND DRAINAGE, FOOT Right 7/25/2019    Performed by Dol[facility] D[facility] at [facility]    REMOVAL, FOREIGN BODY, FOOT Right 7/25/2019    Performed by Dolores Gruber DPM at [facility]     Family History   Problem Relation Age of Onset    Amblyopia Neg Hx     Blindness Neg Hx     Cataracts Neg Hx     Glaucoma Neg Hx     Macular degeneration Neg Hx     Retinal detachment Neg Hx     Strabismus Neg Hx      Social History     Socioeconomic  "History    Marital status: Single     Spouse name: Not on file    Number of children: Not on file    Years of education: Not on file    Highest education level: Not on file   Occupational History    Not on file   Social Needs    Financial resource strain: Not on file    Food insecurity:     Worry: Not on file     Inability: Not on file    Transportation needs:     Medical: Not on file     Non-medical: Not on file   Tobacco Use    Smoking status: Current Every Day Smoker     Packs/day: 0.10     Types: Cigarettes   Substance and Sexual Activity    Alcohol use: Yes     Alcohol/week: 0.0 oz    Drug use: Not on file    Sexual activity: Not on file   Lifestyle    Physical activity:     Days per week: Not on file     Minutes per session: Not on file    Stress: Not on file   Relationships    Social connections:     Talks on phone: Not on file     Gets together: Not on file     Attends Restorationism service: Not on file     Active member of club or organization: Not on file     Attends meetings of clubs or organizations: Not on file     Relationship status: Not on file   Other Topics Concern    Not on file   Social History Narrative    Not on file           Objective:        BP (!) 156/88   Pulse 71   Resp 13   Ht 6' 1" (1.854 m)   Wt 93 kg (205 lb)   BMI 27.05 kg/m²     Physical Exam   Constitutional: Patient is oriented to person, place, and time. Patient appears well-developed and well-nourished. No acute distress.     Psychiatric: Patient has a normal mood and affect. Patient's speech is normal and behavior is normal. Judgment is normal. Cognition and memory are normal.     Right pedal exam was performed today.  Vascular: Pedal pulses palpable 2/4 DP & PT.  CFT is < 3 seconds to the hallux.  Skin temperature is warm to warm proximal tibia to distal toes with mild localized increase in calor noted.  Nonpitting edema with elena-incisional erythema present.  No ecchymosis noted to the foot or ankle.  Hair " growth present distally to the LE.     Musculoskeletal: Ankle joint ROM is decreased. Subtalar joint ROM is within normal limits.  Midtarsal joint ROM is within normal limits.  1st ray ROM is within normal limits.  1st  MTPJ ROM is decreased.  Ankle joint dorsiflexion is restricted with the knee extended and flexed per Silfverskiold exam.    Muscle strength is 5/5 for all LE muscle groups tested.    Neurological: Epicritic sensation is Intact to the foot.   Chaddock STR is Intact to right lower extremity.   Pain upon palpation noted to right medial foot elena-incisionally.    Dermatological: Toenails 1-5 right are WNL in length and thickness.  Webspaces 1-4 right are clean, dry, and intact.  Skin turgor is slightly increased to the medial column of the right foot.  No dry, flaky skin noted to the LE.  Partially closed incision with proximal open wound noted to medial aspect of foot with simple interrupted sutures intact with sanguinous drainage without purulence appreciable.    Nursing note and vitals reviewed.        Assessment:       Encounter Diagnosis   Name Primary?    Status post surgery Yes         Plan:       Francisco was seen today for post-op evaluation.    Diagnoses and all orders for this visit:    Status post surgery      I counseled the patient on his conditions, their implications and medical management.    - With patient's permission, removed dressing, cleansed right medial foot with betadine, performed suture removal of distal 4 sutures and left proximal 3 intact, applied mastisol, steri-strips, betadine, and aquacel foam bandage.    - Advised patient to continue wearing surgical shoe at all times when ambulating, keep dressing CDI, and follow PRICE therapy.    Patient was given the following recommendations and instructions:  Patient Instructions   - Keep dressing clean, dry, and intact.    - Use crutches for ambulatory assistance.    - Wear surgical shoe at all times when ambulating to prevent  further trauma.    - Rest/reduce ambulation.    - Ice foot/ankle for no more than 20 minutes/hour to reduce pain and swelling.    - Keep dressing clean, dry, and intact.    - Elevate foot above the level of the knee as much as possible throughout the day to reduce pain and swelling.    - Take medications as prescribed along with probiotics as directed.    - Notify clinic if redness, swelling, or pain worsens or fails to improve.        Dolores Gruber DPM        Dictation was performed using M*Modal Fluency.  Transcription errors may be present.

## 2019-08-11 NOTE — PROGRESS NOTES
Subjective:      Patient ID: Francisco Maxwell is a 28 y.o. male.    Chief Complaint: No chief complaint on file.      HPI:  Francisco Maxwell is a 28 y.o. male who presents to clinic with a chief complaint of wound right foot s/p I&D and foreign body retrieval.  Patient is 1 week s/p I&D with removal of painful retained foreign body right foot and is here for dressing change.  He reports pain is worse after foot has been hanging in a downward position for a few minutes, describes it as tolerable throbbing, and rates it as 2/10 on the pain scale.  He informs of being almost done with his second antibiotic prescription.  Patient denies any other pedal complaints at this time.      PCP:  KIM Myers MD  Date last seen:  7/23/19  ROQUE aBrbour    Review of Systems   Constitutional: Negative for appetite change, fever, chills, fatigue and unexpected weight change.   Cardiovascular: Negative for chest pain, claudication, cyanosis, and leg swelling.  Gastrointestinal: Negative for nausea, vomiting, diarrhea, and constipation.   Musculoskeletal: Negative for back pain, arthritis, joint pain, joint swelling.  Positive for myalgias, stiffness.   Skin: Negative for nail bed changes, discoloration, suspicious lesion, rash, itching, poor wound healing, unusual hair distribution.     Neurological: Negative for loss of balance, sensory change, paresthesias, and numbness.  Positive for pain.  Hematological: Negative for adenopathy, bleeding, and bruising easily.   Psychiatric/Behavioral: The patient is not nervous/anxious.  Negative for altered mental status.    No results found for: HGBA1C    History reviewed. No pertinent past medical history.  Past Surgical History:   Procedure Laterality Date    INCISION AND DRAINAGE, FOOT Right 7/25/2019    Performed by Dolores Gruber DPM at [facility] OR    REMOVAL, FOREIGN BODY, FOOT Right 7/25/2019    Performed by Dolores Gruber DPM at [facility] OR     Family  History   Problem Relation Age of Onset    Amblyopia Neg Hx     Blindness Neg Hx     Cataracts Neg Hx     Glaucoma Neg Hx     Macular degeneration Neg Hx     Retinal detachment Neg Hx     Strabismus Neg Hx      Social History     Socioeconomic History    Marital status: Single     Spouse name: Not on file    Number of children: Not on file    Years of education: Not on file    Highest education level: Not on file   Occupational History    Not on file   Social Needs    Financial resource strain: Not on file    Food insecurity:     Worry: Not on file     Inability: Not on file    Transportation needs:     Medical: Not on file     Non-medical: Not on file   Tobacco Use    Smoking status: Current Every Day Smoker     Packs/day: 0.10     Types: Cigarettes   Substance and Sexual Activity    Alcohol use: Yes     Alcohol/week: 0.0 oz    Drug use: Not on file    Sexual activity: Not on file   Lifestyle    Physical activity:     Days per week: Not on file     Minutes per session: Not on file    Stress: Not on file   Relationships    Social connections:     Talks on phone: Not on file     Gets together: Not on file     Attends Advent service: Not on file     Active member of club or organization: Not on file     Attends meetings of clubs or organizations: Not on file     Relationship status: Not on file   Other Topics Concern    Not on file   Social History Narrative    Not on file           Objective:        /78   Pulse 77   Ht 6' (1.829 m)   Wt 93 kg (205 lb 0.4 oz)   BMI 27.81 kg/m²     Physical Exam   Constitutional: Patient is oriented to person, place, and time. Patient appears well-developed and well-nourished. No acute distress.     Psychiatric: Patient has a normal mood and affect. Patient's speech is normal and behavior is normal. Judgment is normal. Cognition and memory are normal.     Right pedal exam was performed today.  Vascular: Pedal pulses palpable 2/4 DP & PT.  CFT is < 3  seconds to the hallux.  Skin temperature is warm to warm proximal tibia to distal toes with mild localized increase in calor noted.  Nonpitting edema with elena-incisional erythema present.  No ecchymosis noted to the foot or ankle.  Hair growth present distally to the LE.     Musculoskeletal: Ankle joint ROM is decreased. Subtalar joint ROM is within normal limits.  Midtarsal joint ROM is within normal limits.  1st ray ROM is within normal limits.  1st  MTPJ ROM is decreased.  Ankle joint dorsiflexion is restricted with the knee extended and flexed per Silfverskiold exam.    Muscle strength is 5/5 for all LE muscle groups tested.    Neurological: Epicritic sensation is Intact to the foot.   Chaddock STR is Intact to right lower extremity.   Pain upon palpation noted to right medial foot elena-incisionally.    Dermatological: Toenails 1-5 right are WNL in length and thickness.  Webspaces 1-4 right are clean, dry, and intact.  Skin turgor is slightly increased to the medial column of the right foot.  No dry, flaky skin noted to the LE.  Partially closed incision with proximal open wound noted to medial aspect of foot with simple interrupted sutures intact and skin edges well approximated with steri-strips but not coapted with sanguinous crust without purulence appreciable.    Nursing note and vitals reviewed.        Assessment:       Encounter Diagnoses   Name Primary?    Status post surgery Yes    Pain in right foot          Plan:       Diagnoses and all orders for this visit:    Status post surgery    Pain in right foot      I counseled the patient on his conditions, their implications and medical management.    - Reviewed with patient need to continue wearing surgical shoe at all times when ambulating, keep dressing CDI, follow PRICE therapy, and take medications as prescribed including antibiotics until he finishes their course.    - Removed dressing, cleansed right medial foot wound with betadine, applied  mastisol, steri-strips, betadine, 4 x 4 gauze, ABD pad, cast padding, and coban to foot.    Patient was given the following recommendations and instructions:  Patient Instructions   - Keep dressing clean, dry, and intact.    - Use crutches for ambulatory assistance.    - Wear surgical shoe at all times when ambulating to prevent further trauma.    - Rest/reduce ambulation.    - Ice foot/ankle for no more than 20 minutes/hour to reduce pain and swelling.    - Keep dressing clean, dry, and intact.    - Elevate foot above the level of the knee as much as possible throughout the day to reduce pain and swelling.    - Take medications as prescribed along with probiotics as directed.    - Notify clinic if redness, swelling, or pain worsens or fails to improve.        Dolores Gruber DPM        Dictation was performed using M*Modal Fluency.  Transcription errors may be present.

## 2019-08-12 NOTE — PATIENT INSTRUCTIONS
- Keep dressing clean, dry, and intact.    - Use crutches for ambulatory assistance.    - Wear surgical shoe at all times when ambulating to prevent further trauma.    - Rest/reduce ambulation.    - Ice foot/ankle for no more than 20 minutes/hour to reduce pain and swelling.    - Keep dressing clean, dry, and intact.    - Elevate foot above the level of the knee as much as possible throughout the day to reduce pain and swelling.    - Take medications as prescribed along with probiotics as directed.    - Notify clinic if redness, swelling, or pain worsens or fails to improve.

## 2019-08-13 ENCOUNTER — OFFICE VISIT (OUTPATIENT)
Dept: PODIATRY | Facility: CLINIC | Age: 29
End: 2019-08-13
Payer: COMMERCIAL

## 2019-08-13 VITALS
HEIGHT: 73 IN | DIASTOLIC BLOOD PRESSURE: 78 MMHG | HEART RATE: 85 BPM | BODY MASS INDEX: 27.17 KG/M2 | WEIGHT: 205 LBS | SYSTOLIC BLOOD PRESSURE: 129 MMHG

## 2019-08-13 VITALS — BODY MASS INDEX: 27.17 KG/M2 | WEIGHT: 205 LBS | HEIGHT: 73 IN

## 2019-08-13 DIAGNOSIS — T81.31XA SURGICAL WOUND DEHISCENCE, INITIAL ENCOUNTER: ICD-10-CM

## 2019-08-13 DIAGNOSIS — Z98.890 STATUS POST SURGERY: Primary | ICD-10-CM

## 2019-08-13 PROCEDURE — 99999 PR PBB SHADOW E&M-EST. PATIENT-LVL III: ICD-10-PCS | Mod: PBBFAC,,, | Performed by: PODIATRIST

## 2019-08-13 PROCEDURE — 99024 PR POST-OP FOLLOW-UP VISIT: ICD-10-PCS | Mod: S$GLB,,, | Performed by: PODIATRIST

## 2019-08-13 PROCEDURE — 12001 RPR S/N/AX/GEN/TRNK 2.5CM/<: CPT | Mod: 58,S$GLB,, | Performed by: PODIATRIST

## 2019-08-13 PROCEDURE — 99999 PR PBB SHADOW E&M-EST. PATIENT-LVL III: CPT | Mod: PBBFAC,,, | Performed by: PODIATRIST

## 2019-08-13 PROCEDURE — 99499 NO LOS: ICD-10-PCS | Mod: S$GLB,,, | Performed by: PODIATRIST

## 2019-08-13 PROCEDURE — 99499 UNLISTED E&M SERVICE: CPT | Mod: S$GLB,,, | Performed by: PODIATRIST

## 2019-08-13 PROCEDURE — 99024 POSTOP FOLLOW-UP VISIT: CPT | Mod: S$GLB,,, | Performed by: PODIATRIST

## 2019-08-13 PROCEDURE — 12001 PR RESUPERF WND BODY <2.5CM: ICD-10-PCS | Mod: 58,S$GLB,, | Performed by: PODIATRIST

## 2019-08-17 NOTE — PROGRESS NOTES
Subjective:      Patient ID: Francisco Maxwell is a 28 y.o. male.    Chief Complaint: Follow-up (2wk R foot Dr Myers 7/2019)      HPI:  Francisco Maxwell is a 28 y.o. male who presents to clinic with a chief complaint of wound right foot.  Patient is 3 weeks s/p I&D with removal of painful retained foreign body right foot and is here for dressing change with removal of packing gauze.  He reports doing well and denies any pain but got his bandage wet.  He relates changing it with betadine and a large bandaid.  Patient denies any other pedal complaints at this time.      PCP:  KIM Myers MD  Date last seen:  7/23/19  ROQUE Barbour    Review of Systems   Constitutional: Negative for appetite change, fever, chills, fatigue and unexpected weight change.   Cardiovascular: Negative for chest pain, claudication, cyanosis, and leg swelling.  Gastrointestinal: Negative for nausea, vomiting, diarrhea, and constipation.   Musculoskeletal: Negative for back pain, arthritis, joint pain, joint swelling.  Positive for myalgias, stiffness.   Skin: Negative for nail bed changes, discoloration, suspicious lesion, rash, itching, unusual hair distribution.   Positive for poor wound healing.  Neurological: Negative for loss of balance, sensory change, paresthesias, and numbness.  Positive for pain.  Hematological: Negative for adenopathy, bleeding, and bruising easily.   Psychiatric/Behavioral: The patient is not nervous/anxious.  Negative for altered mental status.    No results found for: HGBA1C    History reviewed. No pertinent past medical history.  Past Surgical History:   Procedure Laterality Date    INCISION AND DRAINAGE, FOOT Right 7/25/2019    Performed by Dol[facility] at [facility]    REMOVAL, FOREIGN BODY, FOOT Right 7/25/2019    Performed by Dolores Gruber DPM at [facility] OR     Family History   Problem Relation Age of Onset    Amblyopia Neg Hx     Blindness Neg Hx     Cataracts Neg Hx   "   Glaucoma Neg Hx     Macular degeneration Neg Hx     Retinal detachment Neg Hx     Strabismus Neg Hx      Social History     Socioeconomic History    Marital status: Single     Spouse name: Not on file    Number of children: Not on file    Years of education: Not on file    Highest education level: Not on file   Occupational History    Not on file   Social Needs    Financial resource strain: Not on file    Food insecurity:     Worry: Not on file     Inability: Not on file    Transportation needs:     Medical: Not on file     Non-medical: Not on file   Tobacco Use    Smoking status: Current Every Day Smoker     Packs/day: 0.10     Types: Cigarettes   Substance and Sexual Activity    Alcohol use: Yes     Alcohol/week: 0.0 oz    Drug use: Not on file    Sexual activity: Not on file   Lifestyle    Physical activity:     Days per week: Not on file     Minutes per session: Not on file    Stress: Not on file   Relationships    Social connections:     Talks on phone: Not on file     Gets together: Not on file     Attends Baptism service: Not on file     Active member of club or organization: Not on file     Attends meetings of clubs or organizations: Not on file     Relationship status: Not on file   Other Topics Concern    Not on file   Social History Narrative    Not on file           Objective:        /78   Pulse 85   Ht 6' 1" (1.854 m)   Wt 93 kg (205 lb)   BMI 27.05 kg/m²     Physical Exam   Constitutional: Patient is oriented to person, place, and time. Patient appears well-developed and well-nourished. No acute distress.     Psychiatric: Patient has a normal mood and affect. Patient's speech is normal and behavior is normal. Judgment is normal. Cognition and memory are normal.     Right pedal exam was performed today.  Vascular: Pedal pulses palpable 2/4 DP & PT.  CFT is < 3 seconds to the hallux.  Skin temperature is warm to warm proximal tibia to distal toes with mild localized " increase in calor noted.  Nonpitting edema with elena-incisional erythema present.  No ecchymosis noted to the foot or ankle.  Hair growth present distally to the LE.     Musculoskeletal: Ankle joint ROM is decreased. Subtalar joint ROM is within normal limits.  Midtarsal joint ROM is within normal limits.  1st ray ROM is within normal limits.  1st  MTPJ ROM is decreased.  Ankle joint dorsiflexion is restricted with the knee extended and flexed per Silfverskiold exam.    Muscle strength is 5/5 for all LE muscle groups tested.    Neurological: Epicritic sensation is Intact to the foot.   Chaddock STR is Intact to right lower extremity.   Pain upon palpation noted to right medial foot elena-incisionally.    Dermatological: Toenails 1-5 right are WNL in length and thickness.  Webspaces 1-4 right are clean, dry, and intact.  Skin turgor is slightly increased to the medial column of the right foot.  No dry, flaky skin noted to the LE.  Partially closed incision with proximal open wound noted to medial aspect of foot with simple interrupted sutures intact but loosened with macerated skin edges and sanguinous drainage without purulence appreciable.  Wound prior to revision after sutures removed measures 0.6 cm x 2.4 cm x 0.5 cm deep.    Nursing note and vitals reviewed.        Assessment:       Encounter Diagnoses   Name Primary?    Status post surgery Yes    Surgical wound dehiscence, initial encounter          Plan:       Francisco was seen today for follow-up.    Diagnoses and all orders for this visit:    Status post surgery    Surgical wound dehiscence, initial encounter      I counseled the patient on his conditions, their implications and medical management.    - Reviewed with patient: wearing surgical shoe at all times when ambulating, keep dressing CDI, follow PRICE therapy, and take medications as prescribed.    - Discussed with patient that wound needs to be revised for proper closure to which patient was  amenable.  Consent obtained after discussion of alternative therapies, pertinent risks and benefits, and likely complications with no 100% guarantees made or implied.    - With patient's permission, removed dressing, cleansed right medial foot proximal to incision with betadine, removed remaining 3 loose sutures, administered 3 cc of lidocaine with epinephrine in a peripheral nerve block fashion, revised wound edges with #15 scalpel, achieved wound closure via reapproximation of skin edges with 4-0 monocryl x 4 sutures in a horizontal mattress fashion, applied Padma, mastisol, steri-strips, and Aquacel foam to foot.    Patient was given the following recommendations and instructions:  Patient Instructions   - Keep dressing clean, dry, and intact.    - Use crutches for ambulatory assistance.    - Wear surgical shoe at all times when ambulating to prevent further trauma.    - Rest/reduce ambulation.    - Ice foot/ankle for no more than 20 minutes/hour to reduce pain and swelling.    - Keep dressing clean, dry, and intact.    - Elevate foot above the level of the knee as much as possible throughout the day to reduce pain and swelling.    - Take medications as prescribed along with probiotics as directed.    - Notify clinic if redness, swelling, or pain worsens or fails to improve.        Dolores Gruber DPM        Dictation was performed using M*Modal Fluency.  Transcription errors may be present.

## 2019-08-20 ENCOUNTER — OFFICE VISIT (OUTPATIENT)
Dept: PODIATRY | Facility: CLINIC | Age: 29
End: 2019-08-20
Payer: COMMERCIAL

## 2019-08-20 VITALS
WEIGHT: 205 LBS | DIASTOLIC BLOOD PRESSURE: 78 MMHG | BODY MASS INDEX: 27.17 KG/M2 | SYSTOLIC BLOOD PRESSURE: 131 MMHG | HEIGHT: 73 IN | HEART RATE: 61 BPM

## 2019-08-20 DIAGNOSIS — Z98.890 STATUS POST SURGERY: Primary | ICD-10-CM

## 2019-08-20 DIAGNOSIS — T81.31XD SURGICAL WOUND DEHISCENCE, SUBSEQUENT ENCOUNTER: ICD-10-CM

## 2019-08-20 PROCEDURE — 99999 PR PBB SHADOW E&M-EST. PATIENT-LVL III: ICD-10-PCS | Mod: PBBFAC,,, | Performed by: PODIATRIST

## 2019-08-20 PROCEDURE — 99024 POSTOP FOLLOW-UP VISIT: CPT | Mod: S$GLB,,, | Performed by: PODIATRIST

## 2019-08-20 PROCEDURE — 99999 PR PBB SHADOW E&M-EST. PATIENT-LVL III: CPT | Mod: PBBFAC,,, | Performed by: PODIATRIST

## 2019-08-20 PROCEDURE — 99024 PR POST-OP FOLLOW-UP VISIT: ICD-10-PCS | Mod: S$GLB,,, | Performed by: PODIATRIST

## 2019-08-25 PROBLEM — T81.31XA SURGICAL WOUND DEHISCENCE, INITIAL ENCOUNTER: Status: ACTIVE | Noted: 2019-08-25

## 2019-08-27 ENCOUNTER — OFFICE VISIT (OUTPATIENT)
Dept: PODIATRY | Facility: CLINIC | Age: 29
End: 2019-08-27
Payer: COMMERCIAL

## 2019-08-27 VITALS
BODY MASS INDEX: 27.17 KG/M2 | SYSTOLIC BLOOD PRESSURE: 141 MMHG | HEART RATE: 71 BPM | DIASTOLIC BLOOD PRESSURE: 84 MMHG | WEIGHT: 205 LBS | HEIGHT: 73 IN

## 2019-08-27 DIAGNOSIS — Z98.890 STATUS POST SURGERY: Primary | ICD-10-CM

## 2019-08-27 PROCEDURE — 99999 PR PBB SHADOW E&M-EST. PATIENT-LVL III: ICD-10-PCS | Mod: PBBFAC,,, | Performed by: PODIATRIST

## 2019-08-27 PROCEDURE — 99999 PR PBB SHADOW E&M-EST. PATIENT-LVL III: CPT | Mod: PBBFAC,,, | Performed by: PODIATRIST

## 2019-08-27 PROCEDURE — 99024 PR POST-OP FOLLOW-UP VISIT: ICD-10-PCS | Mod: S$GLB,,, | Performed by: PODIATRIST

## 2019-08-27 PROCEDURE — 99024 POSTOP FOLLOW-UP VISIT: CPT | Mod: S$GLB,,, | Performed by: PODIATRIST

## 2019-08-27 NOTE — PATIENT INSTRUCTIONS
- Cleanse foot with hibiclens or betadine after shower, pat area dry, and apply a large bandaid to the area for 1 week.    - After 1 week, massage deep scar once daily with hydrocortisone cream until scar breaks up.    - Notify clinic if any new or worsening condition arises.

## 2019-09-02 NOTE — PROGRESS NOTES
Subjective:      Patient ID: Francisco Maxwell is a 29 y.o. male.    Chief Complaint: Post-op Evaluation (2 week post op)      HPI:  Francisco Maxwell is a 29 y.o. male who presents to clinic with a chief complaint of right foot wound 4 weeks s/p I&D with removal of painful retained foreign body right foot and is here for dressing change with possible suture removal.  He reports doing well and denies any pain.  He relates keeping dressing clean, dry, and intact.  Patient denies any other pedal complaints at this time.      PCP:  KIM Myers MD  Date last seen:  7/23/19  ROQUE Barbour    Review of Systems   Constitutional: Negative for appetite change, fever, chills, fatigue and unexpected weight change.   Cardiovascular: Negative for chest pain, claudication, cyanosis, and leg swelling.  Gastrointestinal: Negative for nausea, vomiting, diarrhea, and constipation.   Musculoskeletal: Negative for back pain, arthritis, joint pain, joint swelling.  Positive for myalgias, stiffness.   Skin: Negative for nail bed changes, discoloration, suspicious lesion, rash, itching, unusual hair distribution.   Positive for poor wound healing.  Neurological: Negative for loss of balance, sensory change, paresthesias, and numbness.    Hematological: Negative for adenopathy, bleeding, and bruising easily.   Psychiatric/Behavioral: The patient is not nervous/anxious.  Negative for altered mental status.    No results found for: HGBA1C    History reviewed. No pertinent past medical history.  Past Surgical History:   Procedure Laterality Date    INCISION AND DRAINAGE, FOOT Right 7/25/2019    Performed by Dolores [facility]uber DPM at [facility]    REMOVAL, FOREIGN BODY, FOOT Right 7/25/2019    Performed by Dolores Gruber DPM at [facility] OR     Family History   Problem Relation Age of Onset    Amblyopia Neg Hx     Blindness Neg Hx     Cataracts Neg Hx     Glaucoma Neg Hx     Macular degeneration Neg Hx      "Retinal detachment Neg Hx     Strabismus Neg Hx      Social History     Socioeconomic History    Marital status: Single     Spouse name: Not on file    Number of children: Not on file    Years of education: Not on file    Highest education level: Not on file   Occupational History    Not on file   Social Needs    Financial resource strain: Not on file    Food insecurity:     Worry: Not on file     Inability: Not on file    Transportation needs:     Medical: Not on file     Non-medical: Not on file   Tobacco Use    Smoking status: Current Every Day Smoker     Packs/day: 0.10     Types: Cigarettes   Substance and Sexual Activity    Alcohol use: Yes     Alcohol/week: 0.0 oz    Drug use: Not on file    Sexual activity: Not on file   Lifestyle    Physical activity:     Days per week: Not on file     Minutes per session: Not on file    Stress: Not on file   Relationships    Social connections:     Talks on phone: Not on file     Gets together: Not on file     Attends Advent service: Not on file     Active member of club or organization: Not on file     Attends meetings of clubs or organizations: Not on file     Relationship status: Not on file   Other Topics Concern    Not on file   Social History Narrative    Not on file           Objective:        /78   Pulse 61   Ht 6' 1" (1.854 m)   Wt 93 kg (205 lb 0.4 oz)   BMI 27.05 kg/m²     Physical Exam   Constitutional: Patient is oriented to person, place, and time. Patient appears well-developed and well-nourished. No acute distress.     Psychiatric: Patient has a normal mood and affect. Patient's speech is normal and behavior is normal. Judgment is normal. Cognition and memory are normal.     Right pedal exam was performed today.  Vascular: Pedal pulses palpable 2/4 DP & PT.  CFT is < 3 seconds to the hallux.  Skin temperature is warm to warm proximal tibia to distal toes with mild localized increase in calor noted.  Nonpitting edema with " elena-incisional erythema present.  No ecchymosis noted to the foot or ankle.  Hair growth present distally to the LE.     Musculoskeletal: Ankle joint ROM is decreased. Subtalar joint ROM is within normal limits.  Midtarsal joint ROM is within normal limits.  1st ray ROM is within normal limits.  1st  MTPJ ROM is decreased.  Ankle joint dorsiflexion is restricted with the knee extended and flexed per Silfverskiold exam.    Muscle strength is 5/5 for all LE muscle groups tested.    Neurological: Epicritic sensation is Intact to the foot.   Chaddock STR is Intact to right lower extremity.   Pain upon palpation noted to right medial foot elena-incisionally.    Dermatological: Toenails 1-5 right are WNL in length and thickness.  Webspaces 1-4 right are clean, dry, and intact.  Skin turgor is slightly increased to the medial column of the right foot.  No dry, flaky skin noted to the LE.  Partially closed incision with proximal wound skin edges noted to be well approximated with sutures intact but not coapted.    Nursing note and vitals reviewed.        Assessment:       Encounter Diagnoses   Name Primary?    Status post surgery Yes    Surgical wound dehiscence, subsequent encounter          Plan:       Francisco was seen today for post-op evaluation.    Diagnoses and all orders for this visit:    Status post surgery    Surgical wound dehiscence, subsequent encounter      I counseled the patient on his conditions, their implications and medical management.    - Reviewed with patient: wearing surgical shoe at all times when ambulating, keep dressing CDI, follow PRICE therapy, and take medications as prescribed.    - Discussed with patient that wound needs more time to heal before stitches can be removed.  Patient verbalized all understanding.    Patient was given the following recommendations and instructions:  Patient Instructions   - Keep dressing clean, dry, and intact.    - Use crutches for ambulatory assistance.    -  Wear surgical shoe at all times when ambulating to prevent further trauma.    - Rest/reduce ambulation.    - Ice foot/ankle for no more than 20 minutes/hour to reduce pain and swelling.    - Keep dressing clean, dry, and intact.    - Elevate foot above the level of the knee as much as possible throughout the day to reduce pain and swelling.    - Take medications as prescribed along with probiotics as directed.    - Notify clinic if redness, swelling, or pain worsens or fails to improve.        Dolores Gruber DPM        Dictation was performed using M*Modal Fluency.  Transcription errors may be present.

## 2019-09-09 NOTE — PROGRESS NOTES
Subjective:      Patient ID: Francisco Maxwell is a 29 y.o. male.    Chief Complaint: Wound Check (post op suture removal Dr Myers 2019)      HPI:  Francisco Maxwell is a 29 y.o. male who presents to clinic with a chief complaint of right foot wound 5 weeks s/p I&D with removal of painful retained foreign body right foot and is here for dressing change with possible suture removal.  He reports doing well and denies any pain.  He relates keeping dressing clean, dry, and intact.  No changes since last visit.  Patient denies any other pedal complaints at this time.      PCP:  KIM Myers MD  Date last seen:  7/23/19  ROQUE Barbour    Review of Systems   Constitutional: Negative for appetite change, fever, chills, fatigue and unexpected weight change.   Cardiovascular: Negative for chest pain, claudication, cyanosis, and leg swelling.  Gastrointestinal: Negative for nausea, vomiting, diarrhea, and constipation.   Musculoskeletal: Negative for back pain, arthritis, joint pain, joint swelling, myalgias, stiffness.   Skin: Negative for nail bed changes, discoloration, poor wound healing, suspicious lesion, rash, itching, unusual hair distribution.  Neurological: Negative for loss of balance, sensory change, paresthesias, and numbness.    Hematological: Negative for adenopathy, bleeding, and bruising easily.   Psychiatric/Behavioral: The patient is not nervous/anxious.  Negative for altered mental status.    No results found for: HGBA1C    History reviewed. No pertinent past medical history.  Past Surgical History:   Procedure Laterality Date    INCISION AND DRAINAGE, FOOT Right 7/25/2019    Performed by Dolores Gruber DPM at [facility] OR    REMOVAL, FOREIGN BODY, FOOT Right 7/25/2019    Performed by Dolores Gruber DPM at [facility] OR     Family History   Problem Relation Age of Onset    Amblyopia Neg Hx     Blindness Neg Hx     Cataracts Neg Hx     Glaucoma Neg Hx     Macular degeneration  "Neg Hx     Retinal detachment Neg Hx     Strabismus Neg Hx      Social History     Socioeconomic History    Marital status: Single     Spouse name: Not on file    Number of children: Not on file    Years of education: Not on file    Highest education level: Not on file   Occupational History    Not on file   Social Needs    Financial resource strain: Not on file    Food insecurity:     Worry: Not on file     Inability: Not on file    Transportation needs:     Medical: Not on file     Non-medical: Not on file   Tobacco Use    Smoking status: Current Every Day Smoker     Packs/day: 0.10     Types: Cigarettes   Substance and Sexual Activity    Alcohol use: Yes     Alcohol/week: 0.0 oz    Drug use: Not on file    Sexual activity: Not on file   Lifestyle    Physical activity:     Days per week: Not on file     Minutes per session: Not on file    Stress: Not on file   Relationships    Social connections:     Talks on phone: Not on file     Gets together: Not on file     Attends Cheondo[employer] service: Not on file     Active member of club or organization: Not on file     Attends meetings of clubs or organizations: Not on file     Relationship status: Not on file   Other Topics Concern    Not on file   Social History Narrative    Not on file           Objective:        BP (!) 141/84   Pulse 71   Ht 6' 1" (1.854 m)   Wt 93 kg (205 lb)   BMI 27.05 kg/m²     Physical Exam   Constitutional: Patient is oriented to person, place, and time. Patient appears well-developed and well-nourished. No acute distress.     Psychiatric: Patient has a normal mood and affect. Patient's speech is normal and behavior is normal. Judgment is normal. Cognition and memory are normal.     Right pedal exam was performed today.  Vascular: Pedal pulses palpable 2/4 DP & PT.  CFT is < 3 seconds to the hallux.  Skin temperature is warm to warm proximal tibia to distal toes with mild localized increase in calor noted.  No edema, " erythema, or ecchymosis noted to the foot or ankle.  Hair growth present distally to the LE.     Musculoskeletal: Ankle joint ROM is decreased. Subtalar joint ROM is within normal limits.  Midtarsal joint ROM is within normal limits.  1st ray ROM is within normal limits.  1st  MTPJ ROM is decreased.  Ankle joint dorsiflexion is restricted with the knee extended and flexed per Silfverskiold exam.    Muscle strength is 5/5 for all LE muscle groups tested.    Neurological: Epicritic sensation is Intact to the foot.   Chaddock STR is Intact to right lower extremity.   No pain upon palpation noted to right medial foot.    Dermatological: Toenails 1-5 right are WNL in length and thickness.  Webspaces 1-4 right are clean, dry, and intact.  Skin turgor is supple.  No dry, flaky skin noted to the LE.  Skin edges noted to be well approximated and coapted with sutures intact without micky signs of infection present.    Nursing note and vitals reviewed.        Assessment:       Encounter Diagnosis   Name Primary?    Status post surgery Yes         Plan:       Francisco was seen today for wound check.    Diagnoses and all orders for this visit:    Status post surgery      I counseled the patient on his conditions, their implications and medical management.    - With patient's permission, cleansed surgical site with betadine, performed suture removal of last 4 remaining sutures without incident, applied antibiotic cream and a bandaid.  Patient tolerated procedure well.    Patient was given the following recommendations and instructions:  Patient Instructions   - Cleanse foot with hibiclens or betadine after shower, pat area dry, and apply a large bandaid to the area for 1 week.    - After 1 week, massage deep scar once daily with hydrocortisone cream until scar breaks up.    - Notify clinic if any new or worsening condition arises.          Dolores Gruber DPM        Dictation was performed using M*Modal Fluency.  Transcription  errors may be present.

## 2019-10-28 PROBLEM — G89.18 ACUTE POST-OPERATIVE PAIN: Status: RESOLVED | Noted: 2019-07-26 | Resolved: 2019-10-28

## 2020-03-19 DIAGNOSIS — Z91.89 AT RISK FOR INFECTION: Primary | ICD-10-CM

## 2020-03-19 RX ORDER — HYDROXYCHLOROQUINE SULFATE 200 MG/1
TABLET, FILM COATED ORAL
Qty: 20 TABLET | Refills: 0 | Status: SHIPPED | OUTPATIENT
Start: 2020-03-19 | End: 2020-03-28

## 2020-04-27 ENCOUNTER — TELEPHONE (OUTPATIENT)
Dept: FAMILY MEDICINE | Facility: CLINIC | Age: 30
End: 2020-04-27

## 2020-04-27 NOTE — TELEPHONE ENCOUNTER
----- Message from Griselda Hopkins sent at 4/27/2020  4:01 PM CDT -----  Contact: self   Patient want to speak with a nurse regarding if he had a tetanus injection or not please call back at [phone]    Case number [id number]

## 2020-10-05 ENCOUNTER — PATIENT MESSAGE (OUTPATIENT)
Dept: ADMINISTRATIVE | Facility: HOSPITAL | Age: 30
End: 2020-10-05

## 2021-01-04 ENCOUNTER — PATIENT MESSAGE (OUTPATIENT)
Dept: ADMINISTRATIVE | Facility: HOSPITAL | Age: 31
End: 2021-01-04

## 2021-04-06 ENCOUNTER — PATIENT MESSAGE (OUTPATIENT)
Dept: ADMINISTRATIVE | Facility: HOSPITAL | Age: 31
End: 2021-04-06

## 2021-05-06 ENCOUNTER — PATIENT MESSAGE (OUTPATIENT)
Dept: RESEARCH | Facility: HOSPITAL | Age: 31
End: 2021-05-06

## 2021-07-07 ENCOUNTER — PATIENT MESSAGE (OUTPATIENT)
Dept: ADMINISTRATIVE | Facility: HOSPITAL | Age: 31
End: 2021-07-07

## 2022-03-22 ENCOUNTER — HOSPITAL ENCOUNTER (OUTPATIENT)
Dept: RADIOLOGY | Facility: HOSPITAL | Age: 32
Discharge: HOME | End: 2022-03-22
Attending: FAMILY MEDICINE
Payer: COMMERCIAL

## 2022-03-22 ENCOUNTER — OFFICE VISIT (OUTPATIENT)
Dept: FAMILY MEDICINE | Facility: CLINIC | Age: 32
End: 2022-03-22
Payer: COMMERCIAL

## 2022-03-22 VITALS
BODY MASS INDEX: 28.5 KG/M2 | HEART RATE: 78 BPM | SYSTOLIC BLOOD PRESSURE: 120 MMHG | OXYGEN SATURATION: 99 % | WEIGHT: 216.06 LBS | DIASTOLIC BLOOD PRESSURE: 78 MMHG

## 2022-03-22 DIAGNOSIS — M54.9 MID BACK PAIN, CHRONIC: ICD-10-CM

## 2022-03-22 DIAGNOSIS — G89.29 MID BACK PAIN, CHRONIC: ICD-10-CM

## 2022-03-22 DIAGNOSIS — M54.12 CERVICAL RADICULOPATHY: Primary | ICD-10-CM

## 2022-03-22 DIAGNOSIS — M54.2 CERVICALGIA: ICD-10-CM

## 2022-03-22 PROCEDURE — 1159F MED LIST DOCD IN RCRD: CPT | Mod: CPTII,S$GLB,, | Performed by: FAMILY MEDICINE

## 2022-03-22 PROCEDURE — 72070 XR THORACIC SPINE AP LATERAL: ICD-10-PCS | Mod: 26,,, | Performed by: RADIOLOGY

## 2022-03-22 PROCEDURE — 99999 PR PBB SHADOW E&M-EST. PATIENT-LVL IV: ICD-10-PCS | Mod: PBBFAC,,, | Performed by: FAMILY MEDICINE

## 2022-03-22 PROCEDURE — 99213 PR OFFICE/OUTPT VISIT, EST, LEVL III, 20-29 MIN: ICD-10-PCS | Mod: S$GLB,,, | Performed by: FAMILY MEDICINE

## 2022-03-22 PROCEDURE — 72070 X-RAY EXAM THORAC SPINE 2VWS: CPT | Mod: TC,FY,PO

## 2022-03-22 PROCEDURE — 3078F PR MOST RECENT DIASTOLIC BLOOD PRESSURE < 80 MM HG: ICD-10-PCS | Mod: CPTII,S$GLB,, | Performed by: FAMILY MEDICINE

## 2022-03-22 PROCEDURE — 3008F BODY MASS INDEX DOCD: CPT | Mod: CPTII,S$GLB,, | Performed by: FAMILY MEDICINE

## 2022-03-22 PROCEDURE — 3074F SYST BP LT 130 MM HG: CPT | Mod: CPTII,S$GLB,, | Performed by: FAMILY MEDICINE

## 2022-03-22 PROCEDURE — 1160F PR REVIEW ALL MEDS BY PRESCRIBER/CLIN PHARMACIST DOCUMENTED: ICD-10-PCS | Mod: CPTII,S$GLB,, | Performed by: FAMILY MEDICINE

## 2022-03-22 PROCEDURE — 1160F RVW MEDS BY RX/DR IN RCRD: CPT | Mod: CPTII,S$GLB,, | Performed by: FAMILY MEDICINE

## 2022-03-22 PROCEDURE — 99213 OFFICE O/P EST LOW 20 MIN: CPT | Mod: S$GLB,,, | Performed by: FAMILY MEDICINE

## 2022-03-22 PROCEDURE — 99999 PR PBB SHADOW E&M-EST. PATIENT-LVL IV: CPT | Mod: PBBFAC,,, | Performed by: FAMILY MEDICINE

## 2022-03-22 PROCEDURE — 3078F DIAST BP <80 MM HG: CPT | Mod: CPTII,S$GLB,, | Performed by: FAMILY MEDICINE

## 2022-03-22 PROCEDURE — 3074F PR MOST RECENT SYSTOLIC BLOOD PRESSURE < 130 MM HG: ICD-10-PCS | Mod: CPTII,S$GLB,, | Performed by: FAMILY MEDICINE

## 2022-03-22 PROCEDURE — 72070 X-RAY EXAM THORAC SPINE 2VWS: CPT | Mod: 26,,, | Performed by: RADIOLOGY

## 2022-03-22 PROCEDURE — 3008F PR BODY MASS INDEX (BMI) DOCUMENTED: ICD-10-PCS | Mod: CPTII,S$GLB,, | Performed by: FAMILY MEDICINE

## 2022-03-22 PROCEDURE — 1159F PR MEDICATION LIST DOCUMENTED IN MEDICAL RECORD: ICD-10-PCS | Mod: CPTII,S$GLB,, | Performed by: FAMILY MEDICINE
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
## 2022-03-30 ENCOUNTER — TELEPHONE (OUTPATIENT)
Dept: FAMILY MEDICINE | Facility: CLINIC | Age: 32
End: 2022-03-30
Payer: COMMERCIAL

## 2022-03-30 NOTE — TELEPHONE ENCOUNTER
----- Message from Halley Mak MA sent at 3/30/2022  4:06 PM CDT -----  Contact: [facility]  Type: Needs Medical Advice    Who Called:[facility]   Best Call Back Number: fax [phone]  Inquiry/Question: Would you kindly fax over mri orders to [facility]  for pt has upcoming appt for 04/7 9:30am  Thank you~

## 2022-06-08 DIAGNOSIS — N42.1: Primary | ICD-10-CM

## 2022-06-13 ENCOUNTER — OFFICE VISIT (OUTPATIENT)
Dept: OTOLARYNGOLOGY | Facility: CLINIC | Age: 32
End: 2022-06-13
Payer: COMMERCIAL

## 2022-06-13 ENCOUNTER — LAB VISIT (OUTPATIENT)
Dept: LAB | Facility: HOSPITAL | Age: 32
End: 2022-06-13
Attending: FAMILY MEDICINE
Payer: COMMERCIAL

## 2022-06-13 VITALS — BODY MASS INDEX: 27.91 KG/M2 | TEMPERATURE: 98 F | HEIGHT: 73 IN | WEIGHT: 210.56 LBS

## 2022-06-13 DIAGNOSIS — J38.3 VOCAL CORD STRAIN: ICD-10-CM

## 2022-06-13 DIAGNOSIS — M79.18 MYOFASCIAL PAIN SYNDROME, CERVICAL: ICD-10-CM

## 2022-06-13 DIAGNOSIS — M26.629 TMJPDS (TEMPOROMANDIBULAR JOINT PAIN DYSFUNCTION SYNDROME): Primary | ICD-10-CM

## 2022-06-13 DIAGNOSIS — R49.0 HOARSENESS: ICD-10-CM

## 2022-06-13 DIAGNOSIS — N42.1: ICD-10-CM

## 2022-06-13 LAB
BACTERIA #/AREA URNS HPF: NORMAL /HPF
MICROSCOPIC COMMENT: NORMAL
WBC #/AREA URNS HPF: 2 /HPF (ref 0–5)

## 2022-06-13 PROCEDURE — 3008F PR BODY MASS INDEX (BMI) DOCUMENTED: ICD-10-PCS | Mod: CPTII,S$GLB,, | Performed by: OTOLARYNGOLOGY

## 2022-06-13 PROCEDURE — 99204 OFFICE O/P NEW MOD 45 MIN: CPT | Mod: S$GLB,,, | Performed by: OTOLARYNGOLOGY

## 2022-06-13 PROCEDURE — 1159F MED LIST DOCD IN RCRD: CPT | Mod: CPTII,S$GLB,, | Performed by: OTOLARYNGOLOGY

## 2022-06-13 PROCEDURE — 3008F BODY MASS INDEX DOCD: CPT | Mod: CPTII,S$GLB,, | Performed by: OTOLARYNGOLOGY

## 2022-06-13 PROCEDURE — 99204 PR OFFICE/OUTPT VISIT, NEW, LEVL IV, 45-59 MIN: ICD-10-PCS | Mod: S$GLB,,, | Performed by: OTOLARYNGOLOGY

## 2022-06-13 PROCEDURE — 99999 PR PBB SHADOW E&M-EST. PATIENT-LVL IV: ICD-10-PCS | Mod: PBBFAC,,, | Performed by: OTOLARYNGOLOGY

## 2022-06-13 PROCEDURE — 81000 URINALYSIS NONAUTO W/SCOPE: CPT | Mod: PO | Performed by: FAMILY MEDICINE

## 2022-06-13 PROCEDURE — 1159F PR MEDICATION LIST DOCUMENTED IN MEDICAL RECORD: ICD-10-PCS | Mod: CPTII,S$GLB,, | Performed by: OTOLARYNGOLOGY

## 2022-06-13 PROCEDURE — 99999 PR PBB SHADOW E&M-EST. PATIENT-LVL IV: CPT | Mod: PBBFAC,,, | Performed by: OTOLARYNGOLOGY

## 2022-06-13 PROCEDURE — 1160F RVW MEDS BY RX/DR IN RCRD: CPT | Mod: CPTII,S$GLB,, | Performed by: OTOLARYNGOLOGY

## 2022-06-13 PROCEDURE — 1160F PR REVIEW ALL MEDS BY PRESCRIBER/CLIN PHARMACIST DOCUMENTED: ICD-10-PCS | Mod: CPTII,S$GLB,, | Performed by: OTOLARYNGOLOGY

## 2022-06-13 RX ORDER — DEXTROAMPHETAMINE SACCHARATE, AMPHETAMINE ASPARTATE, DEXTROAMPHETAMINE SULFATE AND AMPHETAMINE SULFATE 3.75; 3.75; 3.75; 3.75 MG/1; MG/1; MG/1; MG/1
15 TABLET ORAL 2 TIMES DAILY
COMMUNITY
Start: 2022-05-19

## 2022-06-13 RX ORDER — SUVOREXANT 20 MG/1
1 TABLET, FILM COATED ORAL NIGHTLY
COMMUNITY
Start: 2022-05-04

## 2022-06-13 NOTE — Clinical Note
Morris - certainly an unfair question but could you look at these 2 static grainy images I included media.  He think this could be right sulcus vocalis?  He really did not have a classic pseudo sulcus appearance.  Ossific this low scarring of the left vocal cord.  Doing speech therapy for chronic coarseness also with a myofascial pain syndrome and TMJ.  Trying to upload the video  - Gallito

## 2022-06-13 NOTE — PROGRESS NOTES
"[facility]    Subjective:      Patient: Francisco Maxwell Patient PCP: JAVID Myers MD         :  1990     Sex:  male      MRN:  [id number]          Date of Visit: 2022      Chief Complaint: Other (Felt like had rock in soft palate. States that "something came out of the area", pt brought it with today. Also, states that has pain on the right side of face, neck, ear and jaw. Has "clicking in the jaw on the right side". Symptoms for about 5 years. )      Patient ID: Francisco Maxwell is a 31 y.o. male current "light" smoker with right jaw and ear pain for 5 years and expectoration of a tonsil stone self-referred for same. No imaging. No treatment.    Maybe use of generalized right sided symptoms.  He has crackling and popping of the TMJ on the right side most notable when he contorted his neck in moves his jaw side to side.  This often results in some relief of symptoms through these contortions.  He has a generalized sense of pressure and fullness through the ear in the right occipital area even down into the right sternoclavicular joint.  Notes from primary care physician in March noted and an MRI of the cervical spine ordered but no results are noted for review.  Patient also feels a discomfort in the area of the tonsil and palate on the right side without any hemoptysis dysphagia.  He does smoke on occasion and has some chronic raspy voice change nothing acute.  He is seeing an oral surgeon later this week he has never worn an oral mouth guard.  He thinks he might clench his teeth at night he does not grind clench during the day.  He has never had any evaluation of his chronic coarseness.  He denies any symptomatic reflux.  Does not have any classic sinus symptoms of sleep loss of smell postnasal drip her nasal obstruction though he has expectorated out a piece of white debris which she describes as looking like a tonsil stone (see status post tonsillectomy from childhood) and shows " it to meatoplasty back today and is quite small and brown flat but he has that used to be white and light.    Review of Systems   Constitutional: Negative.    HENT: Positive for ear pain, facial swelling, sinus pressure, sore throat, trouble swallowing and voice change.    Eyes: Negative.    Respiratory: Negative.    Cardiovascular: Negative.    Gastrointestinal: Positive for abdominal pain.   Endocrine: Negative.    Genitourinary: Negative.    Musculoskeletal: Positive for neck pain.   Skin: Negative.    Allergic/Immunologic: Negative.    Neurological: Negative.    Psychiatric/Behavioral: Negative.         Past Medical History  He has no past medical history on file.    Family / Surgical / Social History  His family history is not on file.    Past Surgical History:   Procedure Laterality Date    INCISION AND DRAINAGE FOOT Right 7/25/2019    Procedure: INCISION AND DRAINAGE, FOOT;  Surgeon: Dolores Gruber DPM;  Location: [facility];  Service: Podiatry;  Laterality: Right;    NOSE SURGERY      multiple nasal surgeries    REMOVAL OF FOREIGN BODY FROM FOOT Right 7/25/2019    Procedure: REMOVAL, FOREIGN BODY, FOOT;  Surgeon: Dolores Gruber DPM;  Location: [facility];  Service: Podiatry;  Laterality: Right;    TONSILLECTOMY         Social History     Tobacco Use    Smoking status: Current Every Day Smoker     Packs/day: 0.10     Types: Vaping with nicotine    Smokeless tobacco: Never Used   Substance and Sexual Activity    Alcohol use: Yes     Alcohol/week: 0.0 standard drinks    Drug use: Not on file    Sexual activity: Not on file       Medications  He has a current medication list which includes the following prescription(s): amino acids, b complex vitamins, belsomra, dextroamphetamine-amphetamine, fish oil-omega-3 fatty acids, magnesium oxide, milk thistle, and multivitamin.      Allergies  Review of patient's allergies indicates:  No Known Allergies    All medications, allergies, and past history have been  reviewed.    Objective:      Vitals:  Vitals - 1 value per visit 3/22/2022 6/13/2022 6/13/2022   SYSTOLIC 120 - -   DIASTOLIC 78 - -   Pulse 78 - -   Temp - - 98.4   Resp - - -   SPO2 99 - -   Weight (lb) 216.05 - 210.54   Weight (kg) 98 - 95.5   Height - - 73   BMI (Calculated) - - 27.8   VISIT REPORT - - -   Pain Score  - 2 -       Body surface area is 2.22 meters squared.    Physical Exam:    GENERAL  APPEARANCE -  alert, appears stated age, cooperative and some pressured speech and mild anxiety  BARRIER(S) TO COMMUNICATION -  none VOICE - strained    INTEGUMENTARY  no suspicious head and neck lesions    HEENT  HEAD: Normocephalic, without obvious abnormality, atraumatic  FACE: INSPECTION - Symmetric, no signs of trauma, no suspicious lesion(s)  PALPATION -  Popping of right TMJ on opening, mild tenderness of masseter and pterygoid areas SALIVARY GLANDS - non-tender with no appreciable mass  STRENGTH - facial symmetry  NECK/THYROID: normal atraumatic, no neck masses, normal thyroid, no jvd    EYES  Normal occular alignment and mobility with no visible nystagmus at rest    EARS/NOSE/MOUTH/THROAT  EARS  PINNAE AND EXTERNAL EARS - no suspicious lesion OTOSCOPIC EXAM (surgical microscopy was not used for visualization/instrumentation): EAR EXAM - Normal ear canals, tympanic membranes and mobility, and middle ear spaces bilaterally.  HEARING - grossly intact to voice/finger rub    NOSE AND SINUSES  EXTERNAL NOSE - Grossly normal for age/sex  SEPTUM - normal/no obstruction on anterior exam without decongestion TURBINATES - within normal limits MUCOSA - within normal limits     MOUTH AND THROAT   ORAL CAVITY, LIPS, TEETH, GUMS & TONGUE - moist, no suspicious lesions  OROPHARYNX /TONSILS/PHARYNGEAL WALLS/HYPOPHARYNX -  S/p tonsillectomy, no mass or asymmetry  NASOPHARYNX - limited mirror exam - unable to visualize due to anatomy/gag  LARYNX -  - limited mirror exam - unable to visualize due to anatomy/gag      CHEST AND  LUNG   INSPECTION & AUSCULTATION - normal effort, no stridor    CARDIOVASCULAR  AUSCULTATION & PERIPHERAL VASCULAR - regular rate and rhythm.    NEUROLOGIC  MENTAL STATUS - alert, interactive CRANIAL NERVES - normal    LYMPHATIC  HEAD AND NECK - non-palpable; SUPRACLAVICULAR - deferred; AXILLARY - deferred; INGUINAL - deferred; LIVER/SPLEEN - deferred        Procedure(s):  Flexible laryngoscopy performed.  See procedure note.    Labs:  WBC   Date Value Ref Range Status   05/19/2016 6.64 3.90 - 12.70 K/uL Final     Hemoglobin   Date Value Ref Range Status   05/19/2016 15.8 14.0 - 18.0 g/dL Final     Platelets   Date Value Ref Range Status   05/19/2016 239 150 - 350 K/uL Final     Creatinine   Date Value Ref Range Status   02/23/2017 1.0 0.5 - 1.4 mg/dL Final     TSH   Date Value Ref Range Status   05/19/2016 1.290 0.400 - 4.000 uIU/mL Final     Glucose   Date Value Ref Range Status   02/23/2017 99 70 - 110 mg/dL Final     Hemoglobin A1C   Date Value Ref Range Status   01/27/2022 5.2 4.8 - 5.6 % Final     Comment:              Prediabetes: 5.7 - 6.4           Diabetes: >6.4           Glycemic control for adults with diabetes: <7.0         Assessment:      Problem List Items Addressed This Visit    None     Visit Diagnoses     TMJPDS (temporomandibular joint pain dysfunction syndrome)    -  Primary    Vocal cord strain        Myofascial pain syndrome, cervical        Hoarseness                   Plan:      Symptoms are most consistent with myofascial pain syndrome.  The exact origin source is not terribly well identified.  There are findings consistent with occipital neuralgia TMJ and significant anterior neck muscle strain and strained voice.  A CT scan without contrast to assess the sinuses given the expectoration of material and earache and neck ache as recommended for screening to rule out any destructive bone process or occult sinusitis.  This certainly will not exclude any mass or inflammatory condition which may  require further testing including MRI scanning.    Speech therapy for vocal strain and what might be pseudo sulcus versus a true sulcus vocalis notably on the right side.  There is some slight scarring of the left vocal cord both of which exhibit some mucosal edema.  Smoking cessation is strongly encouraged and speech therapy.  Further evaluation with Dr. [facility] at [facility] may prove necessary for further evaluation and treatment of the voice.    Follow-up with Oral surgery this week as planned to discuss the symptoms and how they may or may not be related to dysfunction of the temporomandibular joint and whether not a mouth guard should be manufactured prevent clenching at night to help relieve pain symptoms.    Therapy with speech therapy may also need to be augmented with occupational therapy.  Myofascial pain treatment with anti-inflammatory drugs as needed as discussed as well as stretching and general measures such as meditation and mindfulness may also be of benefit.    Conservative care of reflux and throat clearing as outlined.    Return with any concerns.

## 2022-06-13 NOTE — PATIENT INSTRUCTIONS
Symptoms are most consistent with myofascial pain syndrome.  The exact origin source is not terribly well identified.  There are findings consistent with occipital neuralgia TMJ and significant anterior neck muscle strain and strained voice.  A CT scan without contrast to assess the sinuses given the expectoration of material and earache and neck ache as recommended for screening to rule out any destructive bone process or occult sinusitis.  This certainly will not exclude any mass or inflammatory condition which may require further testing including MRI scanning.    Speech therapy for vocal strain and what might be pseudo sulcus versus a true sulcus vocalis notably on the right side.  There is some slight scarring of the left vocal cord both of which exhibit some mucosal edema.  Smoking cessation is strongly encouraged and speech therapy.  Further evaluation with Dr. Kaiser at [facility] may prove necessary for further evaluation and treatment of the voice.    Follow-up with Oral surgery this week as planned to discuss the symptoms and how they may or may not be related to dysfunction of the temporomandibular joint and whether not a mouth guard should be manufactured prevent clenching at night to help relieve pain symptoms.    Therapy with speech therapy may also need to be augmented with occupational therapy.  Myofascial pain treatment with anti-inflammatory drugs as needed as discussed as well as stretching and general measures such as meditation and mindfulness may also be of benefit.    Conservative care of reflux and throat clearing as outlined.    Return with any concerns.          WHAT TO EXPECT FROM VOICE THERAPY    Purpose  The purpose of voice therapy is to help you find a better, more efficient way to use your voice or to reduce symptoms such as coughing, throat clearing, or difficulty breathing.  Depending on your symptoms, you may learn how to produce clearer voice quality, how to reduce fatigue or  pain associated with speaking, how to take care of your voice, and how to eliminate chronic coughing or throat clearing.      Process: Evaluation  First, you may go through some initial testing.  In most cases, a videostroboscopy will be performed in order to allow your speech pathologist and your physician to look at your vocal cords to aid in deciding if you would benefit from voice therapy.  Next, you may work with the speech pathologist to assess the current capabilities of your voice.  Following evaluation, your speech pathologist will design a therapeutic plan to improve your voice as well as other symptoms that may bother you.  At the time of evaluation, your speech pathologist may provide you with exercises to try at home.      Process: Therapy  Most patients benefit from 2-8 sessions over 1-3 months.  Voice therapy involves changing the behavior of your vocal cords and speaking habits, so it is very important that you attend your appointments and do home practices as instructed by your speech pathologist.  Home practice and participation in therapy are critical to meeting your desired voice goals, so of course, we are able to work with you to schedule appointments that are convenient for you.          THROAT CLEARING     Why am I clearing my throat so often?   Irritation of the vocal folds and surrounding area can cause the urge to clear your throat. This irritation can be caused by acid reflux, allergies, or environmental factors, as well as from a cold or sore throat. Talk with your doctor about the treatment of possible underlying causes. However, throat clearing can turn into a cycle. You clear your throat after feeling an irritation, which causes more irritation, which causes you to continue clearing your throat, which causes more irritation.     What can I do about it?   Ask a friend or family member to help you keep track - you may not even realize how often you do it.   Replace the throat clearing  with a different behavior.   Take a sip of water and then swallow. Repeat until the sensation subsides.  Swallow hard (even without water)   Use the silent throat clear method by making the h sound when you exhale  Breathe in deeply through your nose and exhale on shhh   Hum quietly   Keep yourself hydrated.   Drink plenty of water   Eat wet snacks (grapes, apples, melon, cucumber)   Use a humidifier at home or at work   Suck on hard candies, but avoid too much sugar and avoid anything with mint, menthol, camphor, or eucaplyptus, as these will have a more irritating effect.      TMJ Syndrome / Sprain    This is a condition with chronic or recurrent pain in the joint of the jaw (in front of the ear). Just like all other joints in the body (knees, hips, etc.) the jaw joint can be sprained or chronically injured over time. The jaw joint capsule can wear out just like other joints in the body.    The pain may cause limited motion of the jaw, a locking or catching sensation, clicking, popping, or grinding sounds from the joint with movement. It is a very common cause of headache, earache or neck pain. Other symptoms include ringing in the ear, and pressure/fullness of the ear.    The nerve that gives sensation to the jaw joint also gives sensation to the ear and part of the face. This is why pain in the face or ear can be coming from the jaw joint. It is sometimes caused by inflammation in the joint, injury or wear-and-tear of the cartilage in the joint, involuntary grinding of the teeth or poorly fitting dentures. Emotional stress and tension are often a factor. Most cases resolve completely within a few months with proper treatment.    Home Care:  1) Rest the jaw by avoiding crunchy or hard foods to chew. Do not eat hard or sticky candies. Soft foods and liquids are easier on the jaw. Protect your jaw while yawning.  2) Hot compress (small towel soaked in hot water or heating pad) applied to the jaw may give  relief by reducing muscle spasm. Some people get relief with cold packs, so try both and see which one works best for you.  3) You may use ibuprofen (Motrin, Advil) to control pain, unless another medicine was prescribed.   If you weight between 130 and 150 lb, you may take 600 mg of Ibuprofen every 6 hours as needed for a few weeks, then less frequently following this. If you weight > 150 lb, you may take 800 mg every 6 hours for the same time period. Extended use of Ibuprofen is typically OK, but not every 6 hours (usually one or two times per day.)   [ NOTE : If you have chronic liver or kidney disease or ever had a stomach ulcer or GI bleeding, talk with your doctor before using these medicines.]  4) If you suspect emotional stress is related to your condition.  a) Try to identify the sources of stress in your life. It may not be obvious! These may include:  -- Daily hassles of life that pile up (traffic jams, missed appointments, car troubles)  -- Major life changes, both good (new baby, job promotion) and bad (loss of job, loss of loved one)  -- Overload: feeling that you have too many responsibilities and can't take care of everything at once  -- Helplessness: feeling like your problems are more than you can solve  b) When possible, do something about the source of your stress: avoid hassles, limit the amount of change that is happening in your life at one time and take a break when you feel overloaded.  c) Unfortunately, many stressful situations cannot be avoided. Therefore, it is necessary to learn HOW TO MANAGE STRESS better. There are many proven methods that work and will reduce your anxiety. These include simple things like exercise, good nutrition and adequate rest. Also, there are certain techniques that are helpful: relaxation and breathing exercises, visualization, biofeedback, meditation or simply taking some time-out to clear your mind. For more information about this, consult your doctor or go  "to a local bookstore and review the many books and tapes available on this subject.    Mouthguards for Grinding:  Some TMJ issues are worsened by nightly teeth grinding. This can create muscle tension and strain on the jaw joint. Most mouthguards protect the teeth and do NOT reduce the clenching. If the goal is to reduce clenching, I recommend trying an over the counter nightguard called "SmartGuard."  This can be purchased over-the-counter and is available through vendors such as [employer] and [employer].   This mouthguard fits only on the front teeth. As a result, it prevents your molars from contacting, preventing a forceful clench. This should be worn for brief period of time (weeks or months) as it can alter the bite with prolonged use. Most people will notice improvement during this time.      NASAL SALINE    Still saline comes in many preparations including sprays/mists, gels, and rinses.  Different preparations served different purposes.  Saline spray helps to briefly moisturize the nose and help clear mucus.  Saline gels coat the nose for longer protective benefit of keeping the linings the nose moist.  Saline rinses clear the nose and sinuses and a more thorough way in her best used for significant postnasal drip and sinus complaints.  A combination of saline sprays/mists, gels and rinses should be used to address routine nasal clearing and dryness issues as well as flushing for better control of allergy and postnasal drip symptoms.  There is no real risk of over use of nasal saline products.  Saline sprays do not have any of the potential rebound or addiction of nasal decongestant sprays.  Nasal saline sprays and rinses should be used prior to the application of any medicated nasal sprays such as nasal steroids or nasal antihistamine sprays.          ACID REFLUX   What is acid reflux?    When we eat, food passes from the throat and into the stomach through a tube called the esophagus. At the bottom of the " esophagus is a ring of muscles that acts as a valve between the esophagus and stomach, called the lower esophageal sphincter. Smoking, alcohol, and certain types of food may weaken the sphincter, so it may stop closing properly. The contents in the stomach then may leak back, or reflux, into the esophagus. This problem is called gastroesophageal reflux disease (GERD). Symptoms of GERD include heartburn, belching, regurgitation of stomach contents, and swallowing difficulties.    Sometimes, the stomach acid travels up through the esophagus and spills into the larynx or pharynx (voice box). This is called laryngopharyngeal reflux (LPR) and is irritating to the vocal folds and surrounding tissues. Often, patients with LPR do not experience heartburn as a symptom. More commonly, symptoms of LPR include hoarseness, excessive mucous resulting in frequent throat clearing, post-nasal drip, coughing, throat soreness or burning, choking episodes, difficulty swallowing, and sensation of a lump in the throat.     How is acid reflux treated?   Treatment for acid reflux can involve any combination of medication, lifestyle modifications, and surgery.   Medications. Your doctor may prescribe a proton pump inhibitor (PPI) or an H2 blocker. If you are prescribed a PPI, take in on an empty stomach in the morning 30 minutes prior to eating breakfast. Keep in mind that it may take 4-6 weeks before symptoms begin to resolve, so do not stop medications without consulting your doctor.   Lifestyle and dietary modifications. Eat smaller meals at a slower pace. Avoid over-eating. If you are overweight, try to lose weight. Do not lie down or exercise directly after eating; eat your last meal of the day at least 2-3 hours prior to going to sleep. Avoid tight-fitting clothes. If you are a smoker, reduce or quit smoking. Elevate your head of bed 4-6 inches by putting phone books under the legs at the head of your bed or buy a wedge pillow, but  do not use more than two regular pillows as this causes the body to curl and compresses your stomach.     Food group Foods to avoid to reduce reflux   Beverages  Whole milk, 2% milk, chocolate milk/hot chocolate, alcohol, coffee (regular and decaf), caffeinated tea, mint tea, carbonated beverages, citrus juice    Breads/grains Commercial sweet rolls, doughnuts, croissants, and other high-fat pastries    Fruits and vegetables Fried or cream-style vegetables, tomatoes, tomato-based products, citrus fruits, hot peppers    Soups and seasonings Cream, cheese, tomato-based soups, vinegar    Meats and proteins Fatty or fried meat/fish, conway, sausage, pepperoni, lunch meat, fried eggs    Fats and oil Lard, conway drippings, salt pork, meat drippings, gravies, highly seasoned salad dressings, nuts    Sweets/desserts Anything made with or from chocolate, peppermint, spearmint, whole milk, or cream; high-fat pastries, gum, hard candy

## 2022-06-15 ENCOUNTER — TELEPHONE (OUTPATIENT)
Dept: OTOLARYNGOLOGY | Facility: CLINIC | Age: 32
End: 2022-06-15
Payer: COMMERCIAL

## 2022-06-15 NOTE — TELEPHONE ENCOUNTER
----- Message from Radha Centeno, Patient Care Assistant sent at 6/15/2022  2:31 PM CDT -----  Contact: Pt  Type: Needs Medical Advice    Who Called: Pt  Best Call Back Number: [phone]  Inquiry/Question: Pt is calling to see where he can drop off a sample of sputum that came from his salivary glands. Pt states that the doctor told him if it happens again to save it and bring it in. Please call back and advise. Thank you~

## 2022-06-15 NOTE — TELEPHONE ENCOUNTER
"Called pt to make sure he received my earlier voicemail. Pt states that he did. States that he is frustrated because "no one" wants to culture or look at "what is coming out of my body". Advised pt that Dr. Barth stated that from his standpoint, the CT scan will give him the information that he needs, not a culture at this time. Pt states that he will get the CT scan done. States that he will continue to find another provider that will "culture what came out of his body". Thanks, Suzan  "

## 2022-06-15 NOTE — TELEPHONE ENCOUNTER
Left message on voicemail for pt to call back, per Dr. Barth, pt does not need to bring in the sample. Dr. Barth does need pt to schedule the CT Sinus that was ordered. Scheduling left message for pt yesterday to set that up. Thanks, Suzan

## 2022-06-28 ENCOUNTER — NURSE TRIAGE (OUTPATIENT)
Dept: ADMINISTRATIVE | Facility: CLINIC | Age: 32
End: 2022-06-28
Payer: COMMERCIAL

## 2022-06-28 ENCOUNTER — TELEPHONE (OUTPATIENT)
Dept: PULMONOLOGY | Facility: CLINIC | Age: 32
End: 2022-06-28
Payer: COMMERCIAL

## 2022-06-28 NOTE — TELEPHONE ENCOUNTER
Patient is coming in tomorrow.     Patient did not mention any of these symptoms.     ----- Message from [facility] sent at 6/28/2022  4:12 PM CDT -----  .Type:  Same Day Appointment Request     Caller is requesting a same day appointment per nurse on call       Best Call Back Number: [phone]    Additional Information: PT IS SPITTING THROUGH HIS NOSE AND BLOOD IS AT THE BACK OF HIS THROAT WITH A MASS PER PT MOM

## 2022-06-30 ENCOUNTER — TELEPHONE (OUTPATIENT)
Dept: HEMATOLOGY/ONCOLOGY | Facility: CLINIC | Age: 32
End: 2022-06-30
Payer: COMMERCIAL

## 2022-06-30 ENCOUNTER — OFFICE VISIT (OUTPATIENT)
Dept: HEMATOLOGY/ONCOLOGY | Facility: CLINIC | Age: 32
End: 2022-06-30
Payer: COMMERCIAL

## 2022-06-30 VITALS — HEIGHT: 73 IN | BODY MASS INDEX: 27.79 KG/M2 | WEIGHT: 209.69 LBS | OXYGEN SATURATION: 99 % | RESPIRATION RATE: 18 BRPM

## 2022-06-30 DIAGNOSIS — M62.838 MUSCLE SPASM: ICD-10-CM

## 2022-06-30 DIAGNOSIS — J35.8 TONSILLITH: ICD-10-CM

## 2022-06-30 PROCEDURE — 99215 OFFICE O/P EST HI 40 MIN: CPT | Mod: S$GLB,,, | Performed by: OTOLARYNGOLOGY

## 2022-06-30 PROCEDURE — 99999 PR PBB SHADOW E&M-EST. PATIENT-LVL III: CPT | Mod: PBBFAC,,, | Performed by: OTOLARYNGOLOGY

## 2022-06-30 PROCEDURE — 99999 PR PBB SHADOW E&M-EST. PATIENT-LVL III: ICD-10-PCS | Mod: PBBFAC,,, | Performed by: OTOLARYNGOLOGY

## 2022-06-30 PROCEDURE — 1160F PR REVIEW ALL MEDS BY PRESCRIBER/CLIN PHARMACIST DOCUMENTED: ICD-10-PCS | Mod: CPTII,S$GLB,, | Performed by: OTOLARYNGOLOGY

## 2022-06-30 PROCEDURE — 3008F BODY MASS INDEX DOCD: CPT | Mod: CPTII,S$GLB,, | Performed by: OTOLARYNGOLOGY

## 2022-06-30 PROCEDURE — 99215 PR OFFICE/OUTPT VISIT, EST, LEVL V, 40-54 MIN: ICD-10-PCS | Mod: S$GLB,,, | Performed by: OTOLARYNGOLOGY

## 2022-06-30 PROCEDURE — 3008F PR BODY MASS INDEX (BMI) DOCUMENTED: ICD-10-PCS | Mod: CPTII,S$GLB,, | Performed by: OTOLARYNGOLOGY

## 2022-06-30 PROCEDURE — 1159F MED LIST DOCD IN RCRD: CPT | Mod: CPTII,S$GLB,, | Performed by: OTOLARYNGOLOGY

## 2022-06-30 PROCEDURE — 1160F RVW MEDS BY RX/DR IN RCRD: CPT | Mod: CPTII,S$GLB,, | Performed by: OTOLARYNGOLOGY

## 2022-06-30 PROCEDURE — 1159F PR MEDICATION LIST DOCUMENTED IN MEDICAL RECORD: ICD-10-PCS | Mod: CPTII,S$GLB,, | Performed by: OTOLARYNGOLOGY

## 2022-06-30 RX ORDER — DICLOFENAC SODIUM 10 MG/G
GEL TOPICAL
COMMUNITY
Start: 2022-02-21

## 2022-06-30 NOTE — TELEPHONE ENCOUNTER
Appointment with Dr Jenkins confirmed----- Message from Evelina Andrew sent at 6/30/2022 11:58 AM CDT -----  Type:Needs Medical Advice    Who Called:PT  Best call back number:[phone]  Additional Info: Requesting a call back regarding#PT calling to confirm NP appt.  Please Advise- Thank you
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
## 2022-07-02 PROBLEM — M62.838 MUSCLE SPASM: Status: ACTIVE | Noted: 2022-07-02

## 2022-07-02 PROBLEM — J35.8 TONSILLITH: Status: ACTIVE | Noted: 2022-07-02

## 2022-07-28 ENCOUNTER — HOSPITAL ENCOUNTER (OUTPATIENT)
Dept: RADIOLOGY | Facility: HOSPITAL | Age: 32
Discharge: HOME | End: 2022-07-28
Attending: OTOLARYNGOLOGY
Payer: COMMERCIAL

## 2022-07-28 ENCOUNTER — PATIENT MESSAGE (OUTPATIENT)
Dept: OTOLARYNGOLOGY | Facility: CLINIC | Age: 32
End: 2022-07-28
Payer: COMMERCIAL

## 2022-07-28 DIAGNOSIS — M79.18 MYOFASCIAL PAIN SYNDROME, CERVICAL: ICD-10-CM

## 2022-07-28 DIAGNOSIS — M26.629 TMJPDS (TEMPOROMANDIBULAR JOINT PAIN DYSFUNCTION SYNDROME): ICD-10-CM

## 2022-07-28 DIAGNOSIS — R49.0 HOARSENESS: ICD-10-CM

## 2022-07-28 DIAGNOSIS — J38.3 VOCAL CORD STRAIN: ICD-10-CM

## 2022-07-28 PROCEDURE — 70490 CT SOFT TISSUE NECK W/O DYE: CPT | Mod: 26,,, | Performed by: RADIOLOGY

## 2022-07-28 PROCEDURE — 70490 CT SOFT TISSUE NECK W/O DYE: CPT | Mod: TC,PO

## 2022-07-28 PROCEDURE — 70490 CT SOFT TISSUE NECK WITHOUT CONTRAST: ICD-10-PCS | Mod: 26,,, | Performed by: RADIOLOGY

## 2022-08-03 NOTE — TELEPHONE ENCOUNTER
"Trying to figure out what's going on with back of head x 5 years. Seen ENT and GP, had to go out of system and go to natural doctor, dx with lymphatic issue. Doing lymphatic cleanse and things are coming out. Having "stuff" coming and wants to see "lymph" doctor. Had bump to back of head, started on cleanse. Something coming out of soft palate and coughing up stuff. Pt also having lymph node swelling to neck and axillary area. Advised per protocol. Needs to be seen today. LES Long states he has appt with ENT tomorrow.   Reason for Disposition   Tender node in the groin and has a sore, scratch, cut, or painful red area on that leg    Additional Information   Negative: Sounds like a life-threatening emergency to the triager   Negative: Node is in the neck and causes difficulty breathing   Negative: Patient sounds very sick or weak to the triager   Negative: Node is in the neck and can't swallow fluids   Negative: Fever > 103 F (39.4 C)   Negative: Lump or swelling in groin and pulsating (like heartbeat)   Negative: Single large node and size > 1 inch (2.5 cm)   Negative: Overlying skin is red    Protocols used: LYMPH NODES - [id number]      "
negative

## 2022-09-08 ENCOUNTER — TELEPHONE (OUTPATIENT)
Dept: HEMATOLOGY/ONCOLOGY | Facility: CLINIC | Age: 32
End: 2022-09-08
Payer: COMMERCIAL

## 2022-09-08 NOTE — TELEPHONE ENCOUNTER
Pt is not requesting an appt at this time.  He is going to see the MD that Dr Jenkins has recommended and will call back for any additional questions/concerns.  Pt verbalized gratitude for Dr Jenkins's care.

## 2022-09-23 ENCOUNTER — PATIENT MESSAGE (OUTPATIENT)
Dept: SPORTS MEDICINE | Facility: CLINIC | Age: 32
End: 2022-09-23
Payer: COMMERCIAL

## 2022-09-23 ENCOUNTER — TELEPHONE (OUTPATIENT)
Dept: SPORTS MEDICINE | Facility: CLINIC | Age: 32
End: 2022-09-23
Payer: COMMERCIAL

## 2022-09-23 NOTE — TELEPHONE ENCOUNTER
Left VM for patient regarding appointment scheduled .     Patient scheduled incorrectly and needs to be moved to back and spine department.    Jaye Amos MS, OTC  Clinical Assistant to Dr. Oswald Zarate

## 2022-09-26 ENCOUNTER — PATIENT MESSAGE (OUTPATIENT)
Dept: SPORTS MEDICINE | Facility: CLINIC | Age: 32
End: 2022-09-26
Payer: COMMERCIAL

## 2022-10-27 DIAGNOSIS — S91.319D: Primary | ICD-10-CM

## 2022-10-27 RX ORDER — SULFAMETHOXAZOLE AND TRIMETHOPRIM 800; 160 MG/1; MG/1
1 TABLET ORAL 2 TIMES DAILY
Qty: 20 TABLET | Refills: 0 | Status: SHIPPED | OUTPATIENT
Start: 2022-10-27

## 2023-01-10 DIAGNOSIS — M54.12 CERVICAL RADICULITIS: Primary | ICD-10-CM

## 2023-01-17 ENCOUNTER — PATIENT MESSAGE (OUTPATIENT)
Dept: NEUROSURGERY | Facility: CLINIC | Age: 33
End: 2023-01-17
Payer: COMMERCIAL

## 2023-04-24 ENCOUNTER — TELEPHONE (OUTPATIENT)
Dept: HEMATOLOGY/ONCOLOGY | Facility: CLINIC | Age: 33
End: 2023-04-24
Payer: COMMERCIAL

## 2023-04-24 NOTE — TELEPHONE ENCOUNTER
Called pt to give Dr Jenkins's recommendation that he see [facility], LVM with contact information.

## 2023-06-06 ENCOUNTER — PATIENT MESSAGE (OUTPATIENT)
Dept: DERMATOLOGY | Facility: CLINIC | Age: 33
End: 2023-06-06
Payer: COMMERCIAL

## 2024-07-09 ENCOUNTER — PATIENT MESSAGE (OUTPATIENT)
Dept: ADMINISTRATIVE | Facility: HOSPITAL | Age: 34
End: 2024-07-09
Payer: COMMERCIAL